# Patient Record
Sex: FEMALE | Race: WHITE | NOT HISPANIC OR LATINO | Employment: FULL TIME | RURAL
[De-identification: names, ages, dates, MRNs, and addresses within clinical notes are randomized per-mention and may not be internally consistent; named-entity substitution may affect disease eponyms.]

---

## 2020-07-29 ENCOUNTER — HISTORICAL (OUTPATIENT)
Dept: ADMINISTRATIVE | Facility: HOSPITAL | Age: 35
End: 2020-07-29

## 2020-07-30 LAB — SARS-COV-2 RNA AMPLIFICATION, QUAL: NEGATIVE

## 2020-08-07 ENCOUNTER — HISTORICAL (OUTPATIENT)
Dept: ADMINISTRATIVE | Facility: HOSPITAL | Age: 35
End: 2020-08-07

## 2020-08-08 LAB — SARS-COV+SARS-COV-2 AG RESP QL IA.RAPID: POSITIVE

## 2021-05-04 RX ORDER — FLUOXETINE HYDROCHLORIDE 20 MG/1
20 CAPSULE ORAL DAILY
COMMUNITY

## 2021-05-04 RX ORDER — HYDROCODONE BITARTRATE AND ACETAMINOPHEN 7.5; 325 MG/1; MG/1
1 TABLET ORAL EVERY 8 HOURS PRN
COMMUNITY
End: 2021-05-05 | Stop reason: SDUPTHER

## 2021-05-04 RX ORDER — AMITRIPTYLINE HYDROCHLORIDE 50 MG/1
50 TABLET, FILM COATED ORAL NIGHTLY
COMMUNITY
End: 2021-05-05 | Stop reason: SDUPTHER

## 2021-05-04 RX ORDER — CETIRIZINE HYDROCHLORIDE 10 MG/1
10 TABLET ORAL DAILY
COMMUNITY

## 2021-05-04 RX ORDER — PREGABALIN 150 MG/1
150 CAPSULE ORAL 2 TIMES DAILY
COMMUNITY
End: 2021-05-05 | Stop reason: SDUPTHER

## 2021-05-05 ENCOUNTER — OFFICE VISIT (OUTPATIENT)
Dept: PAIN MEDICINE | Facility: CLINIC | Age: 36
End: 2021-05-05
Payer: COMMERCIAL

## 2021-05-05 VITALS
WEIGHT: 221 LBS | SYSTOLIC BLOOD PRESSURE: 127 MMHG | BODY MASS INDEX: 33.49 KG/M2 | HEART RATE: 84 BPM | DIASTOLIC BLOOD PRESSURE: 83 MMHG | HEIGHT: 68 IN

## 2021-05-05 DIAGNOSIS — F11.90 CHRONIC, CONTINUOUS USE OF OPIOIDS: ICD-10-CM

## 2021-05-05 DIAGNOSIS — M25.50 ARTHRALGIA, UNSPECIFIED JOINT: Primary | Chronic | ICD-10-CM

## 2021-05-05 DIAGNOSIS — M54.17 LUMBOSACRAL RADICULOPATHY: Chronic | ICD-10-CM

## 2021-05-05 DIAGNOSIS — G89.29 CHRONIC BILATERAL LOW BACK PAIN WITH SCIATICA, SCIATICA LATERALITY UNSPECIFIED: Chronic | ICD-10-CM

## 2021-05-05 DIAGNOSIS — M54.40 CHRONIC BILATERAL LOW BACK PAIN WITH SCIATICA, SCIATICA LATERALITY UNSPECIFIED: Chronic | ICD-10-CM

## 2021-05-05 PROCEDURE — 99214 OFFICE O/P EST MOD 30 MIN: CPT | Mod: PBBFAC | Performed by: PAIN MEDICINE

## 2021-05-05 PROCEDURE — 99214 OFFICE O/P EST MOD 30 MIN: CPT | Mod: PBBFAC,,, | Performed by: PAIN MEDICINE

## 2021-05-05 PROCEDURE — 99214 HC OFFICE/OUTPT VISIT, EST, LEVL IV, 30-39 MIN: ICD-10-PCS | Mod: PBBFAC,,, | Performed by: PAIN MEDICINE

## 2021-05-05 PROCEDURE — 99214 OFFICE O/P EST MOD 30 MIN: CPT | Mod: S$PBB,,, | Performed by: PAIN MEDICINE

## 2021-05-05 RX ORDER — HYDROCODONE BITARTRATE AND ACETAMINOPHEN 7.5; 325 MG/1; MG/1
1 TABLET ORAL EVERY 8 HOURS PRN
Qty: 90 TABLET | Refills: 0 | Status: SHIPPED | OUTPATIENT
Start: 2021-05-05 | End: 2021-05-05

## 2021-05-05 RX ORDER — PREGABALIN 150 MG/1
150 CAPSULE ORAL 3 TIMES DAILY
Qty: 90 CAPSULE | Refills: 3 | Status: SHIPPED | OUTPATIENT
Start: 2021-05-11 | End: 2021-05-05

## 2021-05-05 RX ORDER — PREGABALIN 150 MG/1
150 CAPSULE ORAL 3 TIMES DAILY
Qty: 90 CAPSULE | Refills: 6 | Status: SHIPPED | OUTPATIENT
Start: 2021-05-05 | End: 2021-06-02 | Stop reason: SDUPTHER

## 2021-05-05 RX ORDER — HYDROCODONE BITARTRATE AND ACETAMINOPHEN 7.5; 325 MG/1; MG/1
1 TABLET ORAL EVERY 8 HOURS PRN
Qty: 90 TABLET | Refills: 0 | Status: SHIPPED | OUTPATIENT
Start: 2021-05-05 | End: 2021-06-04

## 2021-05-05 RX ORDER — AMITRIPTYLINE HYDROCHLORIDE 50 MG/1
50 TABLET, FILM COATED ORAL NIGHTLY
Qty: 30 TABLET | Refills: 3 | Status: SHIPPED | OUTPATIENT
Start: 2021-05-05 | End: 2021-06-02 | Stop reason: SDUPTHER

## 2021-05-05 RX ORDER — TIZANIDINE 4 MG/1
4 TABLET ORAL 3 TIMES DAILY
Qty: 90 TABLET | Refills: 3 | Status: SHIPPED | OUTPATIENT
Start: 2021-05-05 | End: 2021-06-02 | Stop reason: SDUPTHER

## 2021-06-02 ENCOUNTER — OFFICE VISIT (OUTPATIENT)
Dept: PAIN MEDICINE | Facility: CLINIC | Age: 36
End: 2021-06-02
Payer: COMMERCIAL

## 2021-06-02 VITALS
RESPIRATION RATE: 20 BRPM | HEART RATE: 101 BPM | DIASTOLIC BLOOD PRESSURE: 83 MMHG | SYSTOLIC BLOOD PRESSURE: 125 MMHG | WEIGHT: 214 LBS | HEIGHT: 68 IN | BODY MASS INDEX: 32.43 KG/M2

## 2021-06-02 DIAGNOSIS — M25.50 POLYARTHRALGIA: Primary | Chronic | ICD-10-CM

## 2021-06-02 DIAGNOSIS — M47.812 CERVICAL SPONDYLOSIS WITHOUT MYELOPATHY: Chronic | ICD-10-CM

## 2021-06-02 DIAGNOSIS — M47.817 LUMBOSACRAL SPONDYLOSIS WITHOUT MYELOPATHY: Chronic | ICD-10-CM

## 2021-06-02 DIAGNOSIS — Z79.899 ENCOUNTER FOR LONG-TERM (CURRENT) USE OF OTHER MEDICATIONS: ICD-10-CM

## 2021-06-02 LAB
CTP QC/QA: YES
POC (AMP) AMPHETAMINE: NEGATIVE
POC (BAR) BARBITURATES: NEGATIVE
POC (BUP) BUPRENORPHINE: NEGATIVE
POC (BZO) BENZODIAZEPINES: NEGATIVE
POC (COC) COCAINE: NEGATIVE
POC (MDMA) METHYLENEDIOXYMETHAMPHETAMINE 3,4: NEGATIVE
POC (MET) METHAMPHETAMINE: NEGATIVE
POC (MOP) OPIATES: ABNORMAL
POC (MTD) METHADONE: NEGATIVE
POC (OXY) OXYCODONE: NEGATIVE
POC (PCP) PHENCYCLIDINE: NEGATIVE
POC (TCA) TRICYCLIC ANTIDEPRESSANTS: NEGATIVE
POC TEMPERATURE (URINE): 94
POC THC: NEGATIVE

## 2021-06-02 PROCEDURE — 99214 OFFICE O/P EST MOD 30 MIN: CPT | Mod: S$PBB,,, | Performed by: PHYSICIAN ASSISTANT

## 2021-06-02 PROCEDURE — 80305 DRUG TEST PRSMV DIR OPT OBS: CPT | Mod: PBBFAC | Performed by: PHYSICIAN ASSISTANT

## 2021-06-02 PROCEDURE — 99214 OFFICE O/P EST MOD 30 MIN: CPT | Mod: PBBFAC | Performed by: PHYSICIAN ASSISTANT

## 2021-06-02 PROCEDURE — 99214 PR OFFICE/OUTPT VISIT, EST, LEVL IV, 30-39 MIN: ICD-10-PCS | Mod: S$PBB,,, | Performed by: PHYSICIAN ASSISTANT

## 2021-06-02 RX ORDER — PREGABALIN 150 MG/1
150 CAPSULE ORAL EVERY 8 HOURS
Qty: 90 CAPSULE | Refills: 2 | Status: SHIPPED | OUTPATIENT
Start: 2021-06-02 | End: 2021-07-22 | Stop reason: SDUPTHER

## 2021-06-02 RX ORDER — AMITRIPTYLINE HYDROCHLORIDE 50 MG/1
50 TABLET, FILM COATED ORAL NIGHTLY
Qty: 30 TABLET | Refills: 2 | Status: SHIPPED | OUTPATIENT
Start: 2021-06-02 | End: 2021-11-01 | Stop reason: SDUPTHER

## 2021-06-02 RX ORDER — TIZANIDINE 4 MG/1
4 TABLET ORAL EVERY 8 HOURS
Qty: 90 TABLET | Refills: 2 | Status: SHIPPED | OUTPATIENT
Start: 2021-06-02 | End: 2021-07-22 | Stop reason: SDUPTHER

## 2021-06-02 RX ORDER — HYDROCODONE BITARTRATE AND ACETAMINOPHEN 7.5; 325 MG/1; MG/1
1 TABLET ORAL EVERY 8 HOURS
Qty: 90 TABLET | Refills: 0 | Status: SHIPPED | OUTPATIENT
Start: 2021-07-03 | End: 2021-08-02

## 2021-06-02 RX ORDER — HYDROCODONE BITARTRATE AND ACETAMINOPHEN 7.5; 325 MG/1; MG/1
1 TABLET ORAL EVERY 8 HOURS
Qty: 90 TABLET | Refills: 0 | Status: SHIPPED | OUTPATIENT
Start: 2021-08-03 | End: 2021-09-01 | Stop reason: SDUPTHER

## 2021-06-02 RX ORDER — HYDROCODONE BITARTRATE AND ACETAMINOPHEN 7.5; 325 MG/1; MG/1
1 TABLET ORAL EVERY 8 HOURS
Qty: 90 TABLET | Refills: 0 | Status: SHIPPED | OUTPATIENT
Start: 2021-06-04 | End: 2021-07-04

## 2021-07-22 DIAGNOSIS — M47.817 LUMBOSACRAL SPONDYLOSIS WITHOUT MYELOPATHY: Chronic | ICD-10-CM

## 2021-07-22 DIAGNOSIS — M25.50 POLYARTHRALGIA: Chronic | ICD-10-CM

## 2021-07-22 DIAGNOSIS — M47.812 CERVICAL SPONDYLOSIS WITHOUT MYELOPATHY: Chronic | ICD-10-CM

## 2021-07-22 RX ORDER — TIZANIDINE 4 MG/1
4 TABLET ORAL EVERY 8 HOURS
Qty: 90 TABLET | Refills: 2 | Status: SHIPPED | OUTPATIENT
Start: 2021-07-22 | End: 2021-09-01 | Stop reason: SDUPTHER

## 2021-07-22 RX ORDER — PREGABALIN 150 MG/1
150 CAPSULE ORAL EVERY 8 HOURS
Qty: 90 CAPSULE | Refills: 2 | Status: SHIPPED | OUTPATIENT
Start: 2021-07-22 | End: 2021-09-01 | Stop reason: SDUPTHER

## 2021-09-02 ENCOUNTER — OFFICE VISIT (OUTPATIENT)
Dept: PAIN MEDICINE | Facility: CLINIC | Age: 36
End: 2021-09-02
Payer: COMMERCIAL

## 2021-09-02 VITALS
WEIGHT: 212 LBS | HEIGHT: 68 IN | BODY MASS INDEX: 32.13 KG/M2 | HEART RATE: 101 BPM | DIASTOLIC BLOOD PRESSURE: 86 MMHG | SYSTOLIC BLOOD PRESSURE: 158 MMHG

## 2021-09-02 DIAGNOSIS — M47.817 LUMBOSACRAL SPONDYLOSIS WITHOUT MYELOPATHY: Primary | Chronic | ICD-10-CM

## 2021-09-02 DIAGNOSIS — M25.50 POLYARTHRALGIA: Chronic | ICD-10-CM

## 2021-09-02 DIAGNOSIS — Z79.899 ENCOUNTER FOR LONG-TERM (CURRENT) USE OF OTHER MEDICATIONS: ICD-10-CM

## 2021-09-02 DIAGNOSIS — M47.812 CERVICAL SPONDYLOSIS WITHOUT MYELOPATHY: Chronic | ICD-10-CM

## 2021-09-02 PROCEDURE — 99213 OFFICE O/P EST LOW 20 MIN: CPT | Mod: PBBFAC | Performed by: PHYSICIAN ASSISTANT

## 2021-09-02 PROCEDURE — 99214 OFFICE O/P EST MOD 30 MIN: CPT | Mod: S$PBB,,, | Performed by: PHYSICIAN ASSISTANT

## 2021-09-02 PROCEDURE — 80305 DRUG TEST PRSMV DIR OPT OBS: CPT | Mod: PBBFAC | Performed by: PHYSICIAN ASSISTANT

## 2021-09-02 PROCEDURE — 99214 PR OFFICE/OUTPT VISIT, EST, LEVL IV, 30-39 MIN: ICD-10-PCS | Mod: S$PBB,,, | Performed by: PHYSICIAN ASSISTANT

## 2021-09-02 RX ORDER — TIZANIDINE 4 MG/1
4 TABLET ORAL EVERY 8 HOURS
Qty: 90 TABLET | Refills: 1 | Status: SHIPPED | OUTPATIENT
Start: 2021-09-02 | End: 2021-11-01 | Stop reason: SDUPTHER

## 2021-09-02 RX ORDER — HYDROCODONE BITARTRATE AND ACETAMINOPHEN 7.5; 325 MG/1; MG/1
1 TABLET ORAL EVERY 8 HOURS
Qty: 90 TABLET | Refills: 0 | Status: SHIPPED | OUTPATIENT
Start: 2021-10-05 | End: 2021-11-01 | Stop reason: SDUPTHER

## 2021-09-02 RX ORDER — HYDROCODONE BITARTRATE AND ACETAMINOPHEN 7.5; 325 MG/1; MG/1
1 TABLET ORAL EVERY 8 HOURS
Qty: 90 TABLET | Refills: 0 | Status: SHIPPED | OUTPATIENT
Start: 2021-09-05 | End: 2021-10-05

## 2021-09-02 RX ORDER — PREGABALIN 150 MG/1
150 CAPSULE ORAL EVERY 8 HOURS
Qty: 90 CAPSULE | Refills: 1 | Status: SHIPPED | OUTPATIENT
Start: 2021-09-02 | End: 2021-11-01 | Stop reason: SDUPTHER

## 2021-09-02 RX ORDER — AMITRIPTYLINE HYDROCHLORIDE 50 MG/1
50 TABLET, FILM COATED ORAL NIGHTLY
Qty: 30 TABLET | Refills: 2 | Status: CANCELLED | OUTPATIENT
Start: 2021-09-02 | End: 2021-12-01

## 2021-11-02 ENCOUNTER — OFFICE VISIT (OUTPATIENT)
Dept: PAIN MEDICINE | Facility: CLINIC | Age: 36
End: 2021-11-02
Payer: COMMERCIAL

## 2021-11-02 VITALS
WEIGHT: 217 LBS | HEART RATE: 113 BPM | DIASTOLIC BLOOD PRESSURE: 76 MMHG | RESPIRATION RATE: 18 BRPM | BODY MASS INDEX: 32.89 KG/M2 | SYSTOLIC BLOOD PRESSURE: 116 MMHG | HEIGHT: 68 IN

## 2021-11-02 DIAGNOSIS — M25.50 POLYARTHRALGIA: Chronic | ICD-10-CM

## 2021-11-02 DIAGNOSIS — M47.812 CERVICAL SPONDYLOSIS WITHOUT MYELOPATHY: Chronic | ICD-10-CM

## 2021-11-02 DIAGNOSIS — M47.817 LUMBOSACRAL SPONDYLOSIS WITHOUT MYELOPATHY: Primary | Chronic | ICD-10-CM

## 2021-11-02 PROCEDURE — 99214 OFFICE O/P EST MOD 30 MIN: CPT | Mod: S$PBB,,, | Performed by: PHYSICIAN ASSISTANT

## 2021-11-02 PROCEDURE — 99214 OFFICE O/P EST MOD 30 MIN: CPT | Mod: PBBFAC | Performed by: PHYSICIAN ASSISTANT

## 2021-11-02 PROCEDURE — 99214 PR OFFICE/OUTPT VISIT, EST, LEVL IV, 30-39 MIN: ICD-10-PCS | Mod: S$PBB,,, | Performed by: PHYSICIAN ASSISTANT

## 2021-11-02 RX ORDER — HYDROCODONE BITARTRATE AND ACETAMINOPHEN 7.5; 325 MG/1; MG/1
1 TABLET ORAL EVERY 8 HOURS
Qty: 90 TABLET | Refills: 0 | Status: SHIPPED | OUTPATIENT
Start: 2021-11-04 | End: 2021-12-02 | Stop reason: SDUPTHER

## 2021-11-02 RX ORDER — PREGABALIN 150 MG/1
150 CAPSULE ORAL EVERY 8 HOURS
Qty: 90 CAPSULE | Refills: 1 | Status: SHIPPED | OUTPATIENT
Start: 2021-11-02 | End: 2021-12-02 | Stop reason: SDUPTHER

## 2021-11-02 RX ORDER — PREGABALIN 150 MG/1
150 CAPSULE ORAL EVERY 8 HOURS
Qty: 90 CAPSULE | Refills: 1 | Status: SHIPPED | OUTPATIENT
Start: 2021-11-02 | End: 2021-11-02

## 2021-11-02 RX ORDER — AMITRIPTYLINE HYDROCHLORIDE 50 MG/1
50 TABLET, FILM COATED ORAL NIGHTLY
Qty: 30 TABLET | Refills: 1 | Status: SHIPPED | OUTPATIENT
Start: 2021-11-02 | End: 2021-12-02 | Stop reason: SDUPTHER

## 2021-11-02 RX ORDER — HYDROCODONE BITARTRATE AND ACETAMINOPHEN 7.5; 325 MG/1; MG/1
1 TABLET ORAL EVERY 8 HOURS
Qty: 90 TABLET | Refills: 0 | Status: SHIPPED | OUTPATIENT
Start: 2021-11-04 | End: 2021-11-02

## 2021-11-02 RX ORDER — TIZANIDINE 4 MG/1
4 TABLET ORAL EVERY 8 HOURS
Qty: 90 TABLET | Refills: 1 | Status: SHIPPED | OUTPATIENT
Start: 2021-11-02 | End: 2021-12-02 | Stop reason: SDUPTHER

## 2021-11-02 RX ORDER — TIZANIDINE 4 MG/1
4 TABLET ORAL EVERY 8 HOURS
Qty: 90 TABLET | Refills: 1 | Status: SHIPPED | OUTPATIENT
Start: 2021-11-02 | End: 2021-11-02

## 2021-11-02 RX ORDER — AMITRIPTYLINE HYDROCHLORIDE 50 MG/1
50 TABLET, FILM COATED ORAL NIGHTLY
Qty: 30 TABLET | Refills: 1 | Status: SHIPPED | OUTPATIENT
Start: 2021-11-02 | End: 2021-11-02

## 2021-12-03 ENCOUNTER — OFFICE VISIT (OUTPATIENT)
Dept: PAIN MEDICINE | Facility: CLINIC | Age: 36
End: 2021-12-03
Payer: COMMERCIAL

## 2021-12-03 VITALS
WEIGHT: 220 LBS | HEIGHT: 68 IN | HEART RATE: 110 BPM | SYSTOLIC BLOOD PRESSURE: 138 MMHG | DIASTOLIC BLOOD PRESSURE: 94 MMHG | BODY MASS INDEX: 33.34 KG/M2

## 2021-12-03 DIAGNOSIS — M47.817 LUMBOSACRAL SPONDYLOSIS WITHOUT MYELOPATHY: Chronic | ICD-10-CM

## 2021-12-03 DIAGNOSIS — M47.812 CERVICAL SPONDYLOSIS WITHOUT MYELOPATHY: Chronic | ICD-10-CM

## 2021-12-03 DIAGNOSIS — Z79.899 ENCOUNTER FOR LONG-TERM (CURRENT) USE OF OTHER MEDICATIONS: Primary | ICD-10-CM

## 2021-12-03 DIAGNOSIS — M25.50 POLYARTHRALGIA: Chronic | ICD-10-CM

## 2021-12-03 PROCEDURE — 99214 OFFICE O/P EST MOD 30 MIN: CPT | Mod: S$PBB,25,, | Performed by: PHYSICIAN ASSISTANT

## 2021-12-03 PROCEDURE — 96372 THER/PROPH/DIAG INJ SC/IM: CPT | Mod: PBBFAC | Performed by: PHYSICIAN ASSISTANT

## 2021-12-03 PROCEDURE — 99214 PR OFFICE/OUTPT VISIT, EST, LEVL IV, 30-39 MIN: ICD-10-PCS | Mod: S$PBB,25,, | Performed by: PHYSICIAN ASSISTANT

## 2021-12-03 PROCEDURE — 80305 DRUG TEST PRSMV DIR OPT OBS: CPT | Mod: PBBFAC | Performed by: PHYSICIAN ASSISTANT

## 2021-12-03 PROCEDURE — 99213 OFFICE O/P EST LOW 20 MIN: CPT | Mod: PBBFAC | Performed by: PHYSICIAN ASSISTANT

## 2021-12-03 RX ORDER — PREGABALIN 150 MG/1
150 CAPSULE ORAL EVERY 8 HOURS
Qty: 90 CAPSULE | Refills: 1 | Status: SHIPPED | OUTPATIENT
Start: 2021-12-03 | End: 2022-03-02 | Stop reason: SDUPTHER

## 2021-12-03 RX ORDER — KETOROLAC TROMETHAMINE 30 MG/ML
60 INJECTION, SOLUTION INTRAMUSCULAR; INTRAVENOUS
Status: COMPLETED | OUTPATIENT
Start: 2021-12-03 | End: 2021-12-03

## 2021-12-03 RX ORDER — AMITRIPTYLINE HYDROCHLORIDE 50 MG/1
50 TABLET, FILM COATED ORAL NIGHTLY
Qty: 30 TABLET | Refills: 1 | Status: SHIPPED | OUTPATIENT
Start: 2021-12-03 | End: 2022-06-30 | Stop reason: SDUPTHER

## 2021-12-03 RX ORDER — DICLOFENAC SODIUM 75 MG/1
75 TABLET, DELAYED RELEASE ORAL DAILY
COMMUNITY
End: 2022-01-26 | Stop reason: SDUPTHER

## 2021-12-03 RX ORDER — TIZANIDINE 4 MG/1
4 TABLET ORAL EVERY 8 HOURS
Qty: 90 TABLET | Refills: 1 | Status: SHIPPED | OUTPATIENT
Start: 2021-12-03 | End: 2022-03-02 | Stop reason: SDUPTHER

## 2021-12-03 RX ORDER — HYDROCODONE BITARTRATE AND ACETAMINOPHEN 7.5; 325 MG/1; MG/1
1 TABLET ORAL EVERY 8 HOURS
Qty: 90 TABLET | Refills: 0 | Status: SHIPPED | OUTPATIENT
Start: 2021-12-04 | End: 2021-12-03

## 2021-12-03 RX ORDER — TIZANIDINE 4 MG/1
4 TABLET ORAL EVERY 8 HOURS
Qty: 90 TABLET | Refills: 1 | Status: SHIPPED | OUTPATIENT
Start: 2021-12-03 | End: 2021-12-03

## 2021-12-03 RX ORDER — HYDROCODONE BITARTRATE AND ACETAMINOPHEN 7.5; 325 MG/1; MG/1
1 TABLET ORAL EVERY 8 HOURS
Qty: 90 TABLET | Refills: 0 | Status: SHIPPED | OUTPATIENT
Start: 2021-12-04 | End: 2022-01-03

## 2021-12-03 RX ORDER — PREGABALIN 150 MG/1
150 CAPSULE ORAL EVERY 8 HOURS
Qty: 90 CAPSULE | Refills: 1 | Status: SHIPPED | OUTPATIENT
Start: 2021-12-03 | End: 2021-12-03

## 2021-12-03 RX ORDER — AMITRIPTYLINE HYDROCHLORIDE 50 MG/1
50 TABLET, FILM COATED ORAL NIGHTLY
Qty: 30 TABLET | Refills: 1 | Status: SHIPPED | OUTPATIENT
Start: 2021-12-03 | End: 2021-12-03

## 2021-12-03 RX ORDER — HYDROCODONE BITARTRATE AND ACETAMINOPHEN 7.5; 325 MG/1; MG/1
1 TABLET ORAL EVERY 8 HOURS
Qty: 90 TABLET | Refills: 0 | Status: SHIPPED | OUTPATIENT
Start: 2022-01-03 | End: 2021-12-03

## 2021-12-03 RX ORDER — HYDROCODONE BITARTRATE AND ACETAMINOPHEN 7.5; 325 MG/1; MG/1
1 TABLET ORAL EVERY 8 HOURS
Qty: 90 TABLET | Refills: 0 | Status: SHIPPED | OUTPATIENT
Start: 2022-01-03 | End: 2022-02-04 | Stop reason: SDUPTHER

## 2021-12-03 RX ADMIN — KETOROLAC TROMETHAMINE 60 MG: 30 INJECTION, SOLUTION INTRAMUSCULAR; INTRAVENOUS at 10:12

## 2022-01-26 RX ORDER — DICLOFENAC SODIUM 75 MG/1
75 TABLET, DELAYED RELEASE ORAL DAILY
Qty: 30 TABLET | Refills: 0 | Status: SHIPPED | OUTPATIENT
Start: 2022-01-26 | End: 2022-02-17

## 2022-01-26 NOTE — TELEPHONE ENCOUNTER
----- Message from Laura Panchal sent at 1/25/2022  3:03 PM CST -----  Regarding: Medication  Patient would like a Rx for diclofenac to [facility] [place].  Please call [phone].

## 2022-02-04 RX ORDER — HYDROCODONE BITARTRATE AND ACETAMINOPHEN 7.5; 325 MG/1; MG/1
1 TABLET ORAL EVERY 8 HOURS
Qty: 90 TABLET | Refills: 0 | Status: SHIPPED | OUTPATIENT
Start: 2022-02-04 | End: 2022-03-02 | Stop reason: SDUPTHER

## 2022-03-02 NOTE — PROGRESS NOTES
Disclaimer:  This note has been generated using voice recognition software.  There may be type of graft focal areas that have been missed during a proof reading      Subjective:      Patient ID: Monserrat Lee is a 36 y.o. female.    Chief Complaint: Low-back Pain and Leg Pain      Pain  This is a chronic problem. The current episode started more than 1 year ago. The problem occurs daily. The problem has been waxing and waning. Associated symptoms include arthralgias and neck pain. Pertinent negatives include no change in bowel habit, chest pain, coughing, diaphoresis, fever, rash, sore throat, vertigo or vomiting.     Review of Systems   Constitutional: Negative for appetite change, diaphoresis, fever and unexpected weight change.   HENT: Negative for drooling, ear discharge, ear pain, facial swelling, nosebleeds, sore throat, trouble swallowing, voice change and goiter.    Eyes: Negative for photophobia, pain, discharge, redness and visual disturbance.   Respiratory: Negative for apnea, cough, choking, chest tightness, shortness of breath, wheezing and stridor.    Cardiovascular: Negative for chest pain, palpitations and leg swelling.   Gastrointestinal: Negative for abdominal distention, change in bowel habit, diarrhea, rectal pain, vomiting, fecal incontinence and change in bowel habit.   Endocrine: Negative for cold intolerance, heat intolerance, polydipsia, polyphagia and polyuria.   Genitourinary: Negative for bladder incontinence, dysuria, flank pain, frequency and hot flashes.   Musculoskeletal: Positive for arthralgias, back pain, leg pain and neck pain.   Integumentary:  Negative for color change, pallor and rash.   Allergic/Immunologic: Negative for immunocompromised state.   Neurological: Negative for dizziness, vertigo, seizures, syncope, facial asymmetry, speech difficulty, light-headedness, disturbances in coordination, memory loss and coordination difficulties.   Hematological: Negative for  "adenopathy. Does not bruise/bleed easily.   Psychiatric/Behavioral: Negative for agitation, behavioral problems, confusion, decreased concentration, dysphoric mood, hallucinations, self-injury and suicidal ideas. The patient is not nervous/anxious and is not hyperactive.             Objective:  Vitals:    03/04/22 0806 03/04/22 0809   BP: (!) 141/86    Pulse: 95    Resp: 20    Weight: 100.2 kg (221 lb)    Height: 5' 9" (1.753 m)    PainSc:   6   6         Physical Exam  Vitals and nursing note reviewed. Exam conducted with a chaperone present.   Constitutional:       General: She is awake. She is not in acute distress.     Appearance: Normal appearance. She is not ill-appearing.   HENT:      Head: Normocephalic and atraumatic.      Nose: Nose normal.      Mouth/Throat:      Mouth: Mucous membranes are moist.      Pharynx: Oropharynx is clear.   Eyes:      Conjunctiva/sclera: Conjunctivae normal.      Pupils: Pupils are equal, round, and reactive to light.   Cardiovascular:      Rate and Rhythm: Normal rate.   Pulmonary:      Effort: Pulmonary effort is normal. No respiratory distress.   Abdominal:      Palpations: Abdomen is soft.   Musculoskeletal:         General: Normal range of motion.      Right shoulder: Tenderness present.      Left shoulder: Tenderness present.      Right wrist: Tenderness present.      Left wrist: Tenderness present.      Cervical back: Normal range of motion and neck supple. Tenderness present.      Thoracic back: Tenderness present.      Lumbar back: Tenderness present.   Skin:     General: Skin is warm and dry.   Neurological:      General: No focal deficit present.      Mental Status: She is alert and oriented to person, place, and time. Mental status is at baseline.      Cranial Nerves: Cranial nerves are intact. No cranial nerve deficit (II-XII).   Psychiatric:         Mood and Affect: Mood normal.         Behavior: Behavior normal. Behavior is cooperative.         Thought Content: " Thought content normal.           Orders Placed This Encounter   Procedures    POCT Urine Drug Screen Presump     Interpretive Information:     Negative:  No drug detected at the cut off level.   Positive:  This result represents presumptive positive for the   tested drug, other substances may yield a positive response other   than the analyte of interest. This result should be utilized for   diagnostic purpose only. Confirmation testing will be performed upon physician request only.           No image results found.       Office Visit on 12/03/2021   Component Date Value Ref Range Status    POC Amphetamines 12/03/2021 Negative  Negative, Inconclusive Final    POC Barbiturates 12/03/2021 Negative  Negative, Inconclusive Final    POC Benzodiazepines 12/03/2021 Negative  Negative, Inconclusive Final    POC Cocaine 12/03/2021 Negative  Negative, Inconclusive Final    POC THC 12/03/2021 Negative  Negative, Inconclusive Final    POC Methadone 12/03/2021 Negative  Negative, Inconclusive Final    POC Methamphetamine 12/03/2021 Negative  Negative, Inconclusive Final    POC Opiates 12/03/2021 Presumptive Positive (A) Negative, Inconclusive Final    POC Oxycodone 12/03/2021 Negative  Negative, Inconclusive Final    POC Phencyclidine 12/03/2021 Negative  Negative, Inconclusive Final    POC Methylenedioxymethamphetamine * 12/03/2021 Negative  Negative, Inconclusive Final    POC Tricyclic Antidepressants 12/03/2021 Negative  Negative, Inconclusive Final    POC Buprenorphine 12/03/2021 Negative   Final     Acceptable 12/03/2021 Yes   Final    POC Temperature (Urine) 12/03/2021 94   Final         Assessment:      1. Polyarthralgia    2. Cervical spondylosis without myelopathy    3. Lumbosacral spondylosis without myelopathy    4. Encounter for long-term (current) use of other medications            A's of Opioid Risk Assessment  Activity:Patient can perform ADL.   Analgesia:Patients pain is partially  controlled by current medication. Patient has tried OTC medications such as Tylenol and Ibuprofen with out relief.   Adverse Effects: Patient denies constipation or sedation.  Aberrant Behavior:  reviewed with no aberrant drug seeking/taking behavior.  Overdose reversal drug naloxone discussed    Drug screen reviewed      Requested Prescriptions     Signed Prescriptions Disp Refills    pregabalin (LYRICA) 150 MG capsule 90 capsule 1     Sig: Take 1 capsule (150 mg total) by mouth every 8 (eight) hours.    tiZANidine (ZANAFLEX) 4 MG tablet 90 tablet 1     Sig: Take 1 tablet (4 mg total) by mouth every 8 (eight) hours.    HYDROcodone-acetaminophen (NORCO) 7.5-325 mg per tablet 90 tablet 0     Sig: Take 1 tablet by mouth every 8 (eight) hours.    HYDROcodone-acetaminophen (NORCO) 7.5-325 mg per tablet 90 tablet 0     Sig: Take 1 tablet by mouth every 8 (eight) hours.    diclofenac (VOLTAREN) 75 MG EC tablet 30 tablet 1     Sig: Take 1 tablet (75 mg total) by mouth once daily.         Plan:    No new complaints    She states current medications helping control her chronic joint back pain    She would like to continue with conservative management    Continue home exercise program as directed    Continue current medication    Follow-up 2 months    Dr. Velarde, May 2022    Bring original prescription medication bottles/container/box with labels to each visit

## 2022-03-04 ENCOUNTER — OFFICE VISIT (OUTPATIENT)
Dept: PAIN MEDICINE | Facility: CLINIC | Age: 37
End: 2022-03-04

## 2022-03-04 VITALS
RESPIRATION RATE: 20 BRPM | HEIGHT: 69 IN | BODY MASS INDEX: 32.73 KG/M2 | HEART RATE: 95 BPM | SYSTOLIC BLOOD PRESSURE: 141 MMHG | WEIGHT: 221 LBS | DIASTOLIC BLOOD PRESSURE: 86 MMHG

## 2022-03-04 DIAGNOSIS — M25.50 POLYARTHRALGIA: Primary | Chronic | ICD-10-CM

## 2022-03-04 DIAGNOSIS — Z79.899 ENCOUNTER FOR LONG-TERM (CURRENT) USE OF OTHER MEDICATIONS: ICD-10-CM

## 2022-03-04 DIAGNOSIS — M47.812 CERVICAL SPONDYLOSIS WITHOUT MYELOPATHY: Chronic | ICD-10-CM

## 2022-03-04 DIAGNOSIS — M47.817 LUMBOSACRAL SPONDYLOSIS WITHOUT MYELOPATHY: Chronic | ICD-10-CM

## 2022-03-04 LAB
CTP QC/QA: YES
POC (AMP) AMPHETAMINE: NEGATIVE
POC (BAR) BARBITURATES: NEGATIVE
POC (BUP) BUPRENORPHINE: NEGATIVE
POC (BZO) BENZODIAZEPINES: NEGATIVE
POC (COC) COCAINE: NEGATIVE
POC (MDMA) METHYLENEDIOXYMETHAMPHETAMINE 3,4: NEGATIVE
POC (MET) METHAMPHETAMINE: NEGATIVE
POC (MOP) OPIATES: ABNORMAL
POC (MTD) METHADONE: NEGATIVE
POC (OXY) OXYCODONE: NEGATIVE
POC (PCP) PHENCYCLIDINE: NEGATIVE
POC (TCA) TRICYCLIC ANTIDEPRESSANTS: NEGATIVE
POC TEMPERATURE (URINE): 92
POC THC: NEGATIVE

## 2022-03-04 PROCEDURE — 99213 OFFICE O/P EST LOW 20 MIN: CPT | Mod: PBBFAC | Performed by: PHYSICIAN ASSISTANT

## 2022-03-04 PROCEDURE — 3079F PR MOST RECENT DIASTOLIC BLOOD PRESSURE 80-89 MM HG: ICD-10-PCS | Mod: CPTII,,, | Performed by: PHYSICIAN ASSISTANT

## 2022-03-04 PROCEDURE — 3079F DIAST BP 80-89 MM HG: CPT | Mod: CPTII,,, | Performed by: PHYSICIAN ASSISTANT

## 2022-03-04 PROCEDURE — 1159F PR MEDICATION LIST DOCUMENTED IN MEDICAL RECORD: ICD-10-PCS | Mod: CPTII,,, | Performed by: PHYSICIAN ASSISTANT

## 2022-03-04 PROCEDURE — 1159F MED LIST DOCD IN RCRD: CPT | Mod: CPTII,,, | Performed by: PHYSICIAN ASSISTANT

## 2022-03-04 PROCEDURE — 3077F SYST BP >= 140 MM HG: CPT | Mod: CPTII,,, | Performed by: PHYSICIAN ASSISTANT

## 2022-03-04 PROCEDURE — 80305 DRUG TEST PRSMV DIR OPT OBS: CPT | Mod: PBBFAC | Performed by: PHYSICIAN ASSISTANT

## 2022-03-04 PROCEDURE — 3008F BODY MASS INDEX DOCD: CPT | Mod: CPTII,,, | Performed by: PHYSICIAN ASSISTANT

## 2022-03-04 PROCEDURE — 99214 PR OFFICE/OUTPT VISIT, EST, LEVL IV, 30-39 MIN: ICD-10-PCS | Mod: S$PBB,,, | Performed by: PHYSICIAN ASSISTANT

## 2022-03-04 PROCEDURE — 3077F PR MOST RECENT SYSTOLIC BLOOD PRESSURE >= 140 MM HG: ICD-10-PCS | Mod: CPTII,,, | Performed by: PHYSICIAN ASSISTANT

## 2022-03-04 PROCEDURE — 99214 OFFICE O/P EST MOD 30 MIN: CPT | Mod: S$PBB,,, | Performed by: PHYSICIAN ASSISTANT

## 2022-03-04 PROCEDURE — 3008F PR BODY MASS INDEX (BMI) DOCUMENTED: ICD-10-PCS | Mod: CPTII,,, | Performed by: PHYSICIAN ASSISTANT

## 2022-03-04 RX ORDER — HYDROCODONE BITARTRATE AND ACETAMINOPHEN 7.5; 325 MG/1; MG/1
1 TABLET ORAL EVERY 8 HOURS
Qty: 90 TABLET | Refills: 0 | Status: SHIPPED | OUTPATIENT
Start: 2022-04-05 | End: 2022-05-05

## 2022-03-04 RX ORDER — AMITRIPTYLINE HYDROCHLORIDE 50 MG/1
50 TABLET, FILM COATED ORAL NIGHTLY
Qty: 30 TABLET | Refills: 1 | Status: CANCELLED | OUTPATIENT
Start: 2022-03-04

## 2022-03-04 RX ORDER — PREGABALIN 150 MG/1
150 CAPSULE ORAL EVERY 8 HOURS
Qty: 90 CAPSULE | Refills: 1 | Status: SHIPPED | OUTPATIENT
Start: 2022-03-04 | End: 2022-05-04 | Stop reason: SDUPTHER

## 2022-03-04 RX ORDER — TIZANIDINE 4 MG/1
4 TABLET ORAL EVERY 8 HOURS
Qty: 90 TABLET | Refills: 1 | Status: SHIPPED | OUTPATIENT
Start: 2022-03-04 | End: 2022-05-04 | Stop reason: SDUPTHER

## 2022-03-04 RX ORDER — DICLOFENAC SODIUM 75 MG/1
75 TABLET, DELAYED RELEASE ORAL DAILY
Qty: 30 TABLET | Refills: 1 | Status: SHIPPED | OUTPATIENT
Start: 2022-03-04 | End: 2022-05-04 | Stop reason: SDUPTHER

## 2022-03-04 RX ORDER — HYDROCODONE BITARTRATE AND ACETAMINOPHEN 7.5; 325 MG/1; MG/1
1 TABLET ORAL EVERY 8 HOURS
Qty: 90 TABLET | Refills: 0 | Status: SHIPPED | OUTPATIENT
Start: 2022-03-05 | End: 2022-04-04

## 2022-05-04 ENCOUNTER — OFFICE VISIT (OUTPATIENT)
Dept: PAIN MEDICINE | Facility: CLINIC | Age: 37
End: 2022-05-04
Payer: COMMERCIAL

## 2022-05-04 VITALS
HEART RATE: 106 BPM | SYSTOLIC BLOOD PRESSURE: 135 MMHG | WEIGHT: 220.63 LBS | DIASTOLIC BLOOD PRESSURE: 78 MMHG | HEIGHT: 70 IN | BODY MASS INDEX: 31.59 KG/M2

## 2022-05-04 DIAGNOSIS — M47.812 CERVICAL SPONDYLOSIS WITHOUT MYELOPATHY: Chronic | ICD-10-CM

## 2022-05-04 DIAGNOSIS — M25.50 POLYARTHRALGIA: Chronic | ICD-10-CM

## 2022-05-04 DIAGNOSIS — M47.817 LUMBOSACRAL SPONDYLOSIS WITHOUT MYELOPATHY: Chronic | ICD-10-CM

## 2022-05-04 DIAGNOSIS — M54.50 CHRONIC BILATERAL LOW BACK PAIN WITHOUT SCIATICA: Primary | ICD-10-CM

## 2022-05-04 DIAGNOSIS — G89.29 CHRONIC BILATERAL LOW BACK PAIN WITHOUT SCIATICA: Primary | ICD-10-CM

## 2022-05-04 PROCEDURE — 1159F PR MEDICATION LIST DOCUMENTED IN MEDICAL RECORD: ICD-10-PCS | Mod: CPTII,,, | Performed by: PAIN MEDICINE

## 2022-05-04 PROCEDURE — 3008F PR BODY MASS INDEX (BMI) DOCUMENTED: ICD-10-PCS | Mod: CPTII,,, | Performed by: PAIN MEDICINE

## 2022-05-04 PROCEDURE — 3075F PR MOST RECENT SYSTOLIC BLOOD PRESS GE 130-139MM HG: ICD-10-PCS | Mod: CPTII,,, | Performed by: PAIN MEDICINE

## 2022-05-04 PROCEDURE — 3075F SYST BP GE 130 - 139MM HG: CPT | Mod: CPTII,,, | Performed by: PAIN MEDICINE

## 2022-05-04 PROCEDURE — 99214 PR OFFICE/OUTPT VISIT, EST, LEVL IV, 30-39 MIN: ICD-10-PCS | Mod: S$PBB,,, | Performed by: PAIN MEDICINE

## 2022-05-04 PROCEDURE — 1159F MED LIST DOCD IN RCRD: CPT | Mod: CPTII,,, | Performed by: PAIN MEDICINE

## 2022-05-04 PROCEDURE — 3008F BODY MASS INDEX DOCD: CPT | Mod: CPTII,,, | Performed by: PAIN MEDICINE

## 2022-05-04 PROCEDURE — 3078F PR MOST RECENT DIASTOLIC BLOOD PRESSURE < 80 MM HG: ICD-10-PCS | Mod: CPTII,,, | Performed by: PAIN MEDICINE

## 2022-05-04 PROCEDURE — 3078F DIAST BP <80 MM HG: CPT | Mod: CPTII,,, | Performed by: PAIN MEDICINE

## 2022-05-04 PROCEDURE — 99214 OFFICE O/P EST MOD 30 MIN: CPT | Mod: S$PBB,,, | Performed by: PAIN MEDICINE

## 2022-05-04 PROCEDURE — 99213 OFFICE O/P EST LOW 20 MIN: CPT | Mod: PBBFAC | Performed by: PAIN MEDICINE

## 2022-05-04 RX ORDER — DICLOFENAC SODIUM 75 MG/1
75 TABLET, DELAYED RELEASE ORAL 2 TIMES DAILY
Qty: 60 TABLET | Refills: 1 | Status: SHIPPED | OUTPATIENT
Start: 2022-05-04 | End: 2022-06-30 | Stop reason: SDUPTHER

## 2022-05-04 RX ORDER — TIZANIDINE 4 MG/1
4 TABLET ORAL EVERY 8 HOURS
Qty: 90 TABLET | Refills: 1 | Status: SHIPPED | OUTPATIENT
Start: 2022-05-04 | End: 2022-07-11 | Stop reason: SDUPTHER

## 2022-05-04 RX ORDER — PREGABALIN 150 MG/1
150 CAPSULE ORAL EVERY 8 HOURS
Qty: 90 CAPSULE | Refills: 1 | Status: SHIPPED | OUTPATIENT
Start: 2022-05-04 | End: 2022-06-30 | Stop reason: SDUPTHER

## 2022-05-04 RX ORDER — HYDROCODONE BITARTRATE AND ACETAMINOPHEN 7.5; 325 MG/1; MG/1
1 TABLET ORAL EVERY 8 HOURS PRN
Qty: 90 TABLET | Refills: 0 | Status: SHIPPED | OUTPATIENT
Start: 2022-06-04 | End: 2022-06-30 | Stop reason: SDUPTHER

## 2022-05-04 RX ORDER — HYDROCODONE BITARTRATE AND ACETAMINOPHEN 7.5; 325 MG/1; MG/1
1 TABLET ORAL EVERY 8 HOURS PRN
Qty: 90 TABLET | Refills: 0 | Status: SHIPPED | OUTPATIENT
Start: 2022-05-05 | End: 2022-06-04

## 2022-05-04 NOTE — PROGRESS NOTES
She Disclaimer: This note has been generated using voice-recognition software. There may be typographical errors that have been missed during proof-reading        Patient ID: Monserrat Lee is a 36 y.o. female.      Chief Complaint: Low-back Pain      36-year-old female returns today for re-evaluation.  Patient has a diagnosis of porphyria and also suffers from chronic lower back pain and radicular symptoms.  She did not receive a rheumatology evaluation but remains under the care of Dr. Morales for porphyria. She has been on opiates for several years and notes adequate pain relief. She was seen by orthopedic and prescribed diclofenac  for left shoulder pain.  She denies any other  changes since her last office visit          Pain Assessment  Pain Score:   5  Pain Location: Other (Comment) (lower back)  Pain Orientation: Right  Pain Radiating Towards: radiates down right leg to knee  Pain Descriptors: Aching, Constant, Dull, Sharp  Pain Frequency: Continuous  Pain Onset: Awakened from sleep  Clinical Progression: Not changed  Aggravating Factors: Standing, Walking  Pain Intervention(s): Medication (See eMAR), Rest, Heat applied      A's of Opioid Risk Assessment  Activity:Patient can perform ADL.   Analgesia:Patients pain is  controlled by current medication.   Adverse Effects: Patient denies constipation or sedation.  Aberrant Behavior:  reviewed with no aberrant drug seeking/taking behavior.      Patient denies any suicidal or homicidal ideations    Physical Therapy/Home Exercise: yes      No image results found.      Review of Systems   Constitutional: Negative.    HENT: Negative.    Eyes: Negative.    Respiratory: Negative.    Cardiovascular: Negative.    Gastrointestinal: Negative.    Endocrine: Negative.    Genitourinary: Negative.    Musculoskeletal: Positive for arthralgias, back pain and myalgias.   Integumentary:  Negative.   Neurological: Negative.    Hematological: Negative.    Psychiatric/Behavioral:  Negative.              Past Medical History:   Diagnosis Date    Anxiety disorder     Carpal tunnel syndrome     Cervical radiculopathy     Cervical spondylosis without myelopathy     Chronic pain syndrome     Depressive disorder     Herpes simplex     Insomnia     Low back pain     Lumbosacral radiculopathy     Sacroiliitis, not elsewhere classified      Past Surgical History:   Procedure Laterality Date    FOOT SURGERY Left     Cyst Removal Left Foot    TUBAL LIGATION  2008     Social History     Socioeconomic History    Marital status:    Tobacco Use    Smoking status: Current Every Day Smoker     Packs/day: 1.00     Types: Cigarettes    Smokeless tobacco: Never Used   Substance and Sexual Activity    Alcohol use: Not Currently    Drug use: Yes     Types: Hydrocodone     Family History   Problem Relation Age of Onset    Hypertension Father      Review of patient's allergies indicates:  No Known Allergies  has a current medication list which includes the following prescription(s): amitriptyline, cetirizine, fluoxetine, hydrocodone-acetaminophen, diclofenac, [START ON 5/5/2022] hydrocodone-acetaminophen, [START ON 6/4/2022] hydrocodone-acetaminophen, pregabalin, and tizanidine.      Objective:  Vitals:    05/04/22 0853   BP: 135/78   Pulse: 106        Physical Exam  Vitals and nursing note reviewed.   Constitutional:       General: She is not in acute distress.     Appearance: Normal appearance. She is not ill-appearing, toxic-appearing or diaphoretic.   HENT:      Head: Normocephalic and atraumatic.      Nose: Nose normal.      Mouth/Throat:      Mouth: Mucous membranes are moist.   Eyes:      Extraocular Movements: Extraocular movements intact.      Pupils: Pupils are equal, round, and reactive to light.   Cardiovascular:      Rate and Rhythm: Normal rate and regular rhythm.      Heart sounds: Normal heart sounds.   Pulmonary:      Effort: Pulmonary effort is normal. No respiratory  distress.      Breath sounds: Normal breath sounds. No stridor. No wheezing or rhonchi.   Abdominal:      General: Bowel sounds are normal.      Palpations: Abdomen is soft.   Musculoskeletal:         General: No swelling or deformity. Normal range of motion.      Cervical back: Normal and normal range of motion. No spasms or tenderness. No pain with movement. Normal range of motion.      Thoracic back: Normal.      Lumbar back: Tenderness and bony tenderness present. No spasms. Normal range of motion. Negative right straight leg raise test and negative left straight leg raise test. No scoliosis.      Right lower leg: No edema.      Left lower leg: No edema.   Skin:     General: Skin is warm.   Neurological:      General: No focal deficit present.      Mental Status: She is alert and oriented to person, place, and time. Mental status is at baseline.      Cranial Nerves: Cranial nerves are intact. No cranial nerve deficit.      Sensory: Sensation is intact. No sensory deficit.      Motor: No weakness.      Coordination: Coordination normal.      Gait: Gait normal.      Deep Tendon Reflexes: Reflexes are normal and symmetric.   Psychiatric:         Mood and Affect: Mood normal.         Behavior: Behavior normal.           Assessment:      1. Cervical spondylosis without myelopathy    2. Lumbosacral spondylosis without myelopathy    3. Polyarthralgia          Plan:  1. reviewed  2.Addiction, Dependency, Tolerance, Opioid abuse-misuse, Death, Diversion Discussed. Overdose reversal drug Naloxone discussed.  3.Refill/Continue medications for pain control and function       Requested Prescriptions     Signed Prescriptions Disp Refills    diclofenac (VOLTAREN) 75 MG EC tablet 60 tablet 1     Sig: Take 1 tablet (75 mg total) by mouth 2 (two) times daily.    tiZANidine (ZANAFLEX) 4 MG tablet 90 tablet 1     Sig: Take 1 tablet (4 mg total) by mouth every 8 (eight) hours.    pregabalin (LYRICA) 150 MG capsule 90 capsule 1      Sig: Take 1 capsule (150 mg total) by mouth every 8 (eight) hours.    HYDROcodone-acetaminophen (NORCO) 7.5-325 mg per tablet 90 tablet 0     Sig: Take 1 tablet by mouth every 8 (eight) hours as needed for Pain.    HYDROcodone-acetaminophen (NORCO) 7.5-325 mg per tablet 90 tablet 0     Sig: Take 1 tablet by mouth every 8 (eight) hours as needed for Pain.     4.Patient defers nerve block injections, physical therapy or surgical consultation     5.Follow with BETITO Gentile in 2 months for re-evaluation and medication refill         report:  Reviewed and consistent with medication use as prescribed.      The total time spent for evaluation and management on 05/04/2022 including reviewing separately obtained history, performing a medically appropriate exam and evaluation, documenting clinical information in the health record, independently interpreting results and communicating them to the patient/family/caregiver, and ordering medications/tests/procedures was between 15-29 minutes.    The above plan and management options were discussed at length with patient. Patient is in agreement with the above and verbalized understanding. It will be communicated with the referring physician via electronic record, fax, or mail.

## 2022-06-30 NOTE — PROGRESS NOTES
Subjective:      Patient ID: Monserrat Lee is a 36 y.o. female.    Chief Complaint: Low-back Pain      Pain  This is a chronic problem. The current episode started more than 1 year ago. The problem occurs daily. The problem has been unchanged. Associated symptoms include arthralgias and neck pain. Pertinent negatives include no change in bowel habit, chest pain, coughing, diaphoresis, fever, rash, sore throat, vertigo or vomiting.     Review of Systems   Constitutional: Negative for appetite change, diaphoresis, fever and unexpected weight change.   HENT: Negative for drooling, ear discharge, ear pain, facial swelling, nosebleeds, sore throat, trouble swallowing, voice change and goiter.    Eyes: Negative for photophobia, pain, discharge, redness and visual disturbance.   Respiratory: Negative for apnea, cough, choking, chest tightness, shortness of breath, wheezing and stridor.    Cardiovascular: Negative for chest pain, palpitations and leg swelling.   Gastrointestinal: Negative for abdominal distention, change in bowel habit, diarrhea, rectal pain, vomiting, fecal incontinence and change in bowel habit.   Endocrine: Negative for cold intolerance, heat intolerance, polydipsia, polyphagia and polyuria.   Genitourinary: Negative for bladder incontinence, dysuria, flank pain, frequency and hot flashes.   Musculoskeletal: Positive for arthralgias, back pain, leg pain and neck pain.   Integumentary:  Negative for color change, pallor and rash.   Allergic/Immunologic: Negative for immunocompromised state.   Neurological: Negative for dizziness, vertigo, seizures, syncope, facial asymmetry, speech difficulty, light-headedness, disturbances in coordination, memory loss and coordination difficulties.   Hematological: Negative for adenopathy. Does not bruise/bleed easily.   Psychiatric/Behavioral: Negative for agitation, behavioral problems, confusion, decreased concentration, dysphoric mood, hallucinations, self-injury  "and suicidal ideas. The patient is not nervous/anxious and is not hyperactive.             Objective:  Vitals:    07/11/22 1403   BP: (!) 146/84   Pulse: 109   Resp: 18   Weight: 96.2 kg (212 lb)   Height: 5' 8" (1.727 m)   PainSc: 10-Worst pain ever         Physical Exam  Vitals and nursing note reviewed. Exam conducted with a chaperone present.   Constitutional:       General: She is awake. She is not in acute distress.     Appearance: Normal appearance. She is not ill-appearing.   HENT:      Head: Normocephalic and atraumatic.      Nose: Nose normal.      Mouth/Throat:      Mouth: Mucous membranes are moist.      Pharynx: Oropharynx is clear.   Eyes:      Conjunctiva/sclera: Conjunctivae normal.      Pupils: Pupils are equal, round, and reactive to light.   Cardiovascular:      Rate and Rhythm: Normal rate.   Pulmonary:      Effort: Pulmonary effort is normal. No respiratory distress.   Abdominal:      Palpations: Abdomen is soft.   Musculoskeletal:         General: Normal range of motion.      Right shoulder: Tenderness present.      Left shoulder: Tenderness present.      Right wrist: Tenderness present.      Left wrist: Tenderness present.      Cervical back: Normal range of motion and neck supple. Tenderness present.      Thoracic back: Tenderness present.      Lumbar back: Tenderness present.   Skin:     General: Skin is warm and dry.   Neurological:      General: No focal deficit present.      Mental Status: She is alert and oriented to person, place, and time. Mental status is at baseline.      Cranial Nerves: No cranial nerve deficit (II-XII).   Psychiatric:         Mood and Affect: Mood normal.         Behavior: Behavior normal. Behavior is cooperative.         Thought Content: Thought content normal.           Orders Placed This Encounter   Procedures    POCT Urine Drug Screen Presump     Interpretive Information:     Negative:  No drug detected at the cut off level.   Positive:  This result represents " presumptive positive for the   tested drug, other substances may yield a positive response other   than the analyte of interest. This result should be utilized for   diagnostic purpose only. Confirmation testing will be performed upon physician request only.           No image results found.       Office Visit on 03/04/2022   Component Date Value Ref Range Status    POC Amphetamines 03/04/2022 Negative  Negative, Inconclusive Final    POC Barbiturates 03/04/2022 Negative  Negative, Inconclusive Final    POC Benzodiazepines 03/04/2022 Negative  Negative, Inconclusive Final    POC Cocaine 03/04/2022 Negative  Negative, Inconclusive Final    POC THC 03/04/2022 Negative  Negative, Inconclusive Final    POC Methadone 03/04/2022 Negative  Negative, Inconclusive Final    POC Methamphetamine 03/04/2022 Negative  Negative, Inconclusive Final    POC Opiates 03/04/2022 Presumptive Positive (A) Negative, Inconclusive Final    POC Oxycodone 03/04/2022 Negative  Negative, Inconclusive Final    POC Phencyclidine 03/04/2022 Negative  Negative, Inconclusive Final    POC Methylenedioxymethamphetamine * 03/04/2022 Negative  Negative, Inconclusive Final    POC Tricyclic Antidepressants 03/04/2022 Negative  Negative, Inconclusive Final    POC Buprenorphine 03/04/2022 Negative   Final     Acceptable 03/04/2022 Yes   Final    POC Temperature (Urine) 03/04/2022 92   Final         Assessment:      1. Encounter for long-term (current) use of other medications    2. Cervical spondylosis without myelopathy    3. Lumbosacral spondylosis without myelopathy    4. Polyarthralgia            A's of Opioid Risk Assessment  Activity:Patient can perform ADL.   Analgesia:Patients pain is partially controlled by current medication. Patient has tried OTC medications such as Tylenol and Ibuprofen with out relief.   Adverse Effects: Patient denies constipation or sedation.  Aberrant Behavior:  reviewed with no aberrant drug  seeking/taking behavior.  Overdose reversal drug naloxone discussed    Drug screen reviewed      Requested Prescriptions     Signed Prescriptions Disp Refills    amitriptyline (ELAVIL) 50 MG tablet 90 tablet 0     Sig: Take 1 tablet (50 mg total) by mouth every evening.    diclofenac (VOLTAREN) 75 MG EC tablet 60 tablet 2     Sig: Take 1 tablet (75 mg total) by mouth 2 (two) times daily.    pregabalin (LYRICA) 150 MG capsule 270 capsule 0     Sig: Take 1 capsule (150 mg total) by mouth every 8 (eight) hours.    HYDROcodone-acetaminophen (NORCO) 7.5-325 mg per tablet 90 tablet 0     Sig: Take 1 tablet by mouth every 8 (eight) hours as needed for Pain.    HYDROcodone-acetaminophen (NORCO) 7.5-325 mg per tablet 90 tablet 0     Sig: Take 1 tablet by mouth every 8 (eight) hours as needed for Pain.    HYDROcodone-acetaminophen (NORCO) 7.5-325 mg per tablet 90 tablet 0     Sig: Take 1 tablet by mouth every 8 (eight) hours as needed for Pain.    tiZANidine (ZANAFLEX) 4 MG tablet 270 tablet 0     Sig: Take 1 tablet (4 mg total) by mouth every 8 (eight) hours.         Plan:    Continues complaint joint back pain continues following rheumatology    She states current medications helping control her discomfort    She is losing her insurance  For health care    She will try to obtain new insurance    Continue home exercise program as directed    Continue current medication    Follow-up 3 months 2    Dr. Velarde, May 2023    Bring original prescription medication bottles/container/box with labels to each visit

## 2022-07-11 ENCOUNTER — OFFICE VISIT (OUTPATIENT)
Dept: PAIN MEDICINE | Facility: CLINIC | Age: 37
End: 2022-07-11
Payer: COMMERCIAL

## 2022-07-11 VITALS
RESPIRATION RATE: 18 BRPM | BODY MASS INDEX: 32.13 KG/M2 | WEIGHT: 212 LBS | HEART RATE: 109 BPM | DIASTOLIC BLOOD PRESSURE: 84 MMHG | HEIGHT: 68 IN | SYSTOLIC BLOOD PRESSURE: 146 MMHG

## 2022-07-11 DIAGNOSIS — Z79.899 ENCOUNTER FOR LONG-TERM (CURRENT) USE OF OTHER MEDICATIONS: Primary | ICD-10-CM

## 2022-07-11 DIAGNOSIS — M47.817 LUMBOSACRAL SPONDYLOSIS WITHOUT MYELOPATHY: Chronic | ICD-10-CM

## 2022-07-11 DIAGNOSIS — M25.50 POLYARTHRALGIA: Chronic | ICD-10-CM

## 2022-07-11 DIAGNOSIS — M47.812 CERVICAL SPONDYLOSIS WITHOUT MYELOPATHY: Chronic | ICD-10-CM

## 2022-07-11 PROCEDURE — 99214 OFFICE O/P EST MOD 30 MIN: CPT | Mod: S$PBB,,, | Performed by: PHYSICIAN ASSISTANT

## 2022-07-11 PROCEDURE — 3008F PR BODY MASS INDEX (BMI) DOCUMENTED: ICD-10-PCS | Mod: CPTII,,, | Performed by: PHYSICIAN ASSISTANT

## 2022-07-11 PROCEDURE — 1159F MED LIST DOCD IN RCRD: CPT | Mod: CPTII,,, | Performed by: PHYSICIAN ASSISTANT

## 2022-07-11 PROCEDURE — 3077F PR MOST RECENT SYSTOLIC BLOOD PRESSURE >= 140 MM HG: ICD-10-PCS | Mod: CPTII,,, | Performed by: PHYSICIAN ASSISTANT

## 2022-07-11 PROCEDURE — 3077F SYST BP >= 140 MM HG: CPT | Mod: CPTII,,, | Performed by: PHYSICIAN ASSISTANT

## 2022-07-11 PROCEDURE — 99214 PR OFFICE/OUTPT VISIT, EST, LEVL IV, 30-39 MIN: ICD-10-PCS | Mod: S$PBB,,, | Performed by: PHYSICIAN ASSISTANT

## 2022-07-11 PROCEDURE — 80305 DRUG TEST PRSMV DIR OPT OBS: CPT | Mod: PBBFAC | Performed by: PHYSICIAN ASSISTANT

## 2022-07-11 PROCEDURE — 99213 OFFICE O/P EST LOW 20 MIN: CPT | Mod: PBBFAC | Performed by: PHYSICIAN ASSISTANT

## 2022-07-11 PROCEDURE — 3079F PR MOST RECENT DIASTOLIC BLOOD PRESSURE 80-89 MM HG: ICD-10-PCS | Mod: CPTII,,, | Performed by: PHYSICIAN ASSISTANT

## 2022-07-11 PROCEDURE — 1159F PR MEDICATION LIST DOCUMENTED IN MEDICAL RECORD: ICD-10-PCS | Mod: CPTII,,, | Performed by: PHYSICIAN ASSISTANT

## 2022-07-11 PROCEDURE — 3008F BODY MASS INDEX DOCD: CPT | Mod: CPTII,,, | Performed by: PHYSICIAN ASSISTANT

## 2022-07-11 PROCEDURE — 3079F DIAST BP 80-89 MM HG: CPT | Mod: CPTII,,, | Performed by: PHYSICIAN ASSISTANT

## 2022-07-11 RX ORDER — TIZANIDINE 4 MG/1
4 TABLET ORAL EVERY 8 HOURS
Qty: 270 TABLET | Refills: 0 | Status: SHIPPED | OUTPATIENT
Start: 2022-07-11 | End: 2022-10-05 | Stop reason: SDUPTHER

## 2022-07-11 RX ORDER — HYDROCODONE BITARTRATE AND ACETAMINOPHEN 7.5; 325 MG/1; MG/1
1 TABLET ORAL EVERY 8 HOURS PRN
COMMUNITY
End: 2022-10-05 | Stop reason: SDUPTHER

## 2022-07-11 RX ORDER — PREGABALIN 150 MG/1
150 CAPSULE ORAL EVERY 8 HOURS
Qty: 270 CAPSULE | Refills: 0 | Status: SHIPPED | OUTPATIENT
Start: 2022-07-11 | End: 2022-10-05 | Stop reason: SDUPTHER

## 2022-07-11 RX ORDER — HYDROCODONE BITARTRATE AND ACETAMINOPHEN 7.5; 325 MG/1; MG/1
1 TABLET ORAL EVERY 8 HOURS PRN
Qty: 90 TABLET | Refills: 0 | Status: SHIPPED | OUTPATIENT
Start: 2022-08-10 | End: 2022-09-09

## 2022-07-11 RX ORDER — HYDROCODONE BITARTRATE AND ACETAMINOPHEN 7.5; 325 MG/1; MG/1
1 TABLET ORAL EVERY 8 HOURS PRN
Qty: 90 TABLET | Refills: 0 | Status: SHIPPED | OUTPATIENT
Start: 2022-07-11 | End: 2022-08-10

## 2022-07-11 RX ORDER — DICLOFENAC SODIUM 75 MG/1
75 TABLET, DELAYED RELEASE ORAL 2 TIMES DAILY
Qty: 60 TABLET | Refills: 2 | Status: SHIPPED | OUTPATIENT
Start: 2022-07-11 | End: 2022-10-05 | Stop reason: SDUPTHER

## 2022-07-11 RX ORDER — AMITRIPTYLINE HYDROCHLORIDE 50 MG/1
50 TABLET, FILM COATED ORAL NIGHTLY
Qty: 90 TABLET | Refills: 0 | Status: SHIPPED | OUTPATIENT
Start: 2022-07-11 | End: 2022-10-05 | Stop reason: SDUPTHER

## 2022-07-11 RX ORDER — HYDROCODONE BITARTRATE AND ACETAMINOPHEN 7.5; 325 MG/1; MG/1
1 TABLET ORAL EVERY 8 HOURS PRN
Qty: 90 TABLET | Refills: 0 | Status: SHIPPED | OUTPATIENT
Start: 2022-09-09 | End: 2022-10-05 | Stop reason: SDUPTHER

## 2022-10-05 NOTE — PROGRESS NOTES
Subjective:         Patient ID: Monserrat Lee is a 37 y.o. female.    Chief Complaint: Low-back Pain      Pain  This is a chronic problem. The current episode started more than 1 year ago. The problem occurs daily. The problem has been waxing and waning. Associated symptoms include arthralgias and neck pain. Pertinent negatives include no anorexia, change in bowel habit, chest pain, coughing, diaphoresis, fever, rash, sore throat, vertigo or vomiting.   Review of Systems   Constitutional:  Negative for activity change, appetite change, diaphoresis, fever and unexpected weight change.   HENT:  Negative for drooling, ear discharge, ear pain, facial swelling, nosebleeds, sore throat, trouble swallowing, voice change and goiter.    Eyes:  Negative for photophobia, pain, discharge, redness and visual disturbance.   Respiratory:  Negative for apnea, cough, choking, chest tightness, shortness of breath, wheezing and stridor.    Cardiovascular:  Negative for chest pain, palpitations and leg swelling.   Gastrointestinal:  Negative for abdominal distention, anorexia, change in bowel habit, diarrhea, rectal pain, vomiting, fecal incontinence and change in bowel habit.   Endocrine: Negative for cold intolerance, heat intolerance, polydipsia, polyphagia and polyuria.   Genitourinary:  Negative for bladder incontinence, dysuria, flank pain, frequency and hot flashes.   Musculoskeletal:  Positive for arthralgias, back pain, leg pain and neck pain.   Integumentary:  Negative for color change, pallor and rash.   Allergic/Immunologic: Negative for immunocompromised state.   Neurological:  Negative for dizziness, vertigo, seizures, syncope, facial asymmetry, speech difficulty, light-headedness, coordination difficulties, memory loss and coordination difficulties.   Hematological:  Negative for adenopathy. Does not bruise/bleed easily.   Psychiatric/Behavioral:  Negative for agitation, behavioral problems, confusion, decreased  "concentration, dysphoric mood, hallucinations, self-injury and suicidal ideas. The patient is not nervous/anxious and is not hyperactive.          Past Medical History:   Diagnosis Date    Anxiety disorder     Carpal tunnel syndrome     Cervical radiculopathy     Cervical spondylosis without myelopathy     Chronic pain syndrome     Depressive disorder     Herpes simplex     Insomnia     Low back pain     Lumbosacral radiculopathy     Sacroiliitis, not elsewhere classified      Past Surgical History:   Procedure Laterality Date    FOOT SURGERY Left     Cyst Removal Left Foot    TUBAL LIGATION  2008     Social History     Socioeconomic History    Marital status:    Tobacco Use    Smoking status: Every Day     Packs/day: 1.00     Types: Cigarettes    Smokeless tobacco: Never   Substance and Sexual Activity    Alcohol use: Not Currently    Drug use: Yes     Types: Hydrocodone     Family History   Problem Relation Age of Onset    Hypertension Father      Review of patient's allergies indicates:  No Known Allergies     Objective:  Vitals:    10/06/22 0928 10/06/22 0929   BP: 126/86    Pulse: 76    Weight: 92.1 kg (203 lb)    Height: 5' 10.8" (1.798 m)    PainSc:   4   4         Physical Exam  Vitals and nursing note reviewed. Exam conducted with a chaperone present.   Constitutional:       General: She is awake. She is not in acute distress.     Appearance: Normal appearance. She is not ill-appearing or diaphoretic.   HENT:      Head: Normocephalic and atraumatic.      Nose: Nose normal.      Mouth/Throat:      Mouth: Mucous membranes are moist.      Pharynx: Oropharynx is clear.   Eyes:      Conjunctiva/sclera: Conjunctivae normal.      Pupils: Pupils are equal, round, and reactive to light.   Cardiovascular:      Rate and Rhythm: Normal rate.   Pulmonary:      Effort: Pulmonary effort is normal. No respiratory distress.   Abdominal:      Palpations: Abdomen is soft.   Musculoskeletal:         General: Normal " range of motion.      Right shoulder: Tenderness present.      Left shoulder: Tenderness present.      Right wrist: Tenderness present.      Left wrist: Tenderness present.      Cervical back: Normal range of motion and neck supple. Tenderness present.      Thoracic back: Tenderness present.      Lumbar back: Tenderness present.   Skin:     General: Skin is warm and dry.   Neurological:      General: No focal deficit present.      Mental Status: She is alert and oriented to person, place, and time. Mental status is at baseline.      Cranial Nerves: No cranial nerve deficit (II-XII).   Psychiatric:         Mood and Affect: Mood normal.         Behavior: Behavior normal. Behavior is cooperative.         Thought Content: Thought content normal.         No image results found.       Office Visit on 07/11/2022   Component Date Value Ref Range Status    POC Amphetamines 07/11/2022 Negative  Negative, Inconclusive Final    POC Barbiturates 07/11/2022 Negative  Negative, Inconclusive Final    POC Benzodiazepines 07/11/2022 Negative  Negative, Inconclusive Final    POC Cocaine 07/11/2022 Negative  Negative, Inconclusive Final    POC THC 07/11/2022 Negative  Negative, Inconclusive Final    POC Methadone 07/11/2022 Negative  Negative, Inconclusive Final    POC Methamphetamine 07/11/2022 Negative  Negative, Inconclusive Final    POC Opiates 07/11/2022 Presumptive Positive (A)  Negative, Inconclusive Final    POC Oxycodone 07/11/2022 Negative  Negative, Inconclusive Final    POC Phencyclidine 07/11/2022 Negative  Negative, Inconclusive Final    POC Methylenedioxymethamphetamine * 07/11/2022 Negative  Negative, Inconclusive Final    POC Tricyclic Antidepressants 07/11/2022 Negative  Negative, Inconclusive Final    POC Buprenorphine 07/11/2022 Negative   Final     Acceptable 07/11/2022 Yes   Final    POC Temperature (Urine) 07/11/2022 92   Final         Orders Placed This Encounter   Procedures    POCT Urine Drug  Screen Presump     Interpretive Information:     Negative:  No drug detected at the cut off level.   Positive:  This result represents presumptive positive for the   tested drug, other substances may yield a positive response other   than the analyte of interest. This result should be utilized for   diagnostic purpose only. Confirmation testing will be performed upon physician request only.          Requested Prescriptions     Signed Prescriptions Disp Refills    tiZANidine (ZANAFLEX) 4 MG tablet 270 tablet 0     Sig: Take 1 tablet (4 mg total) by mouth every 8 (eight) hours.    pregabalin (LYRICA) 150 MG capsule 270 capsule 0     Sig: Take 1 capsule (150 mg total) by mouth every 8 (eight) hours.    diclofenac (VOLTAREN) 75 MG EC tablet 60 tablet 2     Sig: Take 1 tablet (75 mg total) by mouth 2 (two) times daily.    amitriptyline (ELAVIL) 50 MG tablet 90 tablet 0     Sig: Take 1 tablet (50 mg total) by mouth every evening.    HYDROcodone-acetaminophen (NORCO) 7.5-325 mg per tablet 90 tablet 0     Sig: Take 1 tablet by mouth every 8 (eight) hours as needed for Pain.    HYDROcodone-acetaminophen (NORCO) 7.5-325 mg per tablet 90 tablet 0     Sig: Take 1 tablet by mouth every 8 (eight) hours as needed for Pain.       Assessment:     1. Lumbosacral spondylosis without myelopathy    2. Cervical spondylosis without myelopathy    3. Polyarthralgia    4. Encounter for long-term (current) use of other medications         A's of Opioid Risk Assessment  Activity:Patient can perform ADL.   Analgesia:Patients pain is partially controlled by current medication. Patient has tried OTC medications such as Tylenol and Ibuprofen with out relief.   Adverse Effects: Patient denies constipation or sedation.  Aberrant Behavior:  reviewed with no aberrant drug seeking/taking behavior.  Overdose reversal drug naloxone discussed    Drug screen reviewed      Plan:    Following rheumatology    No new complaints doing quite well current  medication is helping control her discomfort    She would like to continue conservative management    Continue home exercise program as directed    Continue current medication    Follow-up 3 months 2    Dr. Velarde, May 2023    Bring original prescription medication bottles/container/box with labels to each visit    Pill count    Physical therapy    Massage therapy declines

## 2022-10-06 ENCOUNTER — OFFICE VISIT (OUTPATIENT)
Dept: PAIN MEDICINE | Facility: CLINIC | Age: 37
End: 2022-10-06
Payer: COMMERCIAL

## 2022-10-06 VITALS
HEART RATE: 76 BPM | DIASTOLIC BLOOD PRESSURE: 86 MMHG | BODY MASS INDEX: 28.42 KG/M2 | WEIGHT: 203 LBS | HEIGHT: 71 IN | SYSTOLIC BLOOD PRESSURE: 126 MMHG

## 2022-10-06 DIAGNOSIS — Z79.899 ENCOUNTER FOR LONG-TERM (CURRENT) USE OF OTHER MEDICATIONS: ICD-10-CM

## 2022-10-06 DIAGNOSIS — M25.50 POLYARTHRALGIA: Chronic | ICD-10-CM

## 2022-10-06 DIAGNOSIS — M47.812 CERVICAL SPONDYLOSIS WITHOUT MYELOPATHY: Chronic | ICD-10-CM

## 2022-10-06 DIAGNOSIS — M47.817 LUMBOSACRAL SPONDYLOSIS WITHOUT MYELOPATHY: Primary | Chronic | ICD-10-CM

## 2022-10-06 LAB
CTP QC/QA: YES
POC (AMP) AMPHETAMINE: NEGATIVE
POC (BAR) BARBITURATES: NEGATIVE
POC (BUP) BUPRENORPHINE: NEGATIVE
POC (BZO) BENZODIAZEPINES: NEGATIVE
POC (COC) COCAINE: NEGATIVE
POC (MDMA) METHYLENEDIOXYMETHAMPHETAMINE 3,4: NEGATIVE
POC (MET) METHAMPHETAMINE: NEGATIVE
POC (MOP) OPIATES: ABNORMAL
POC (MTD) METHADONE: NEGATIVE
POC (OXY) OXYCODONE: NEGATIVE
POC (PCP) PHENCYCLIDINE: NEGATIVE
POC (TCA) TRICYCLIC ANTIDEPRESSANTS: NEGATIVE
POC TEMPERATURE (URINE): 0
POC THC: NEGATIVE

## 2022-10-06 PROCEDURE — 80305 DRUG TEST PRSMV DIR OPT OBS: CPT | Mod: PBBFAC | Performed by: PHYSICIAN ASSISTANT

## 2022-10-06 PROCEDURE — 99213 OFFICE O/P EST LOW 20 MIN: CPT | Mod: PBBFAC | Performed by: PHYSICIAN ASSISTANT

## 2022-10-06 PROCEDURE — 3074F PR MOST RECENT SYSTOLIC BLOOD PRESSURE < 130 MM HG: ICD-10-PCS | Mod: CPTII,,, | Performed by: PHYSICIAN ASSISTANT

## 2022-10-06 PROCEDURE — 1159F PR MEDICATION LIST DOCUMENTED IN MEDICAL RECORD: ICD-10-PCS | Mod: CPTII,,, | Performed by: PHYSICIAN ASSISTANT

## 2022-10-06 PROCEDURE — 3079F DIAST BP 80-89 MM HG: CPT | Mod: CPTII,,, | Performed by: PHYSICIAN ASSISTANT

## 2022-10-06 PROCEDURE — 1159F MED LIST DOCD IN RCRD: CPT | Mod: CPTII,,, | Performed by: PHYSICIAN ASSISTANT

## 2022-10-06 PROCEDURE — 3008F BODY MASS INDEX DOCD: CPT | Mod: CPTII,,, | Performed by: PHYSICIAN ASSISTANT

## 2022-10-06 PROCEDURE — 3074F SYST BP LT 130 MM HG: CPT | Mod: CPTII,,, | Performed by: PHYSICIAN ASSISTANT

## 2022-10-06 PROCEDURE — 3008F PR BODY MASS INDEX (BMI) DOCUMENTED: ICD-10-PCS | Mod: CPTII,,, | Performed by: PHYSICIAN ASSISTANT

## 2022-10-06 PROCEDURE — 3079F PR MOST RECENT DIASTOLIC BLOOD PRESSURE 80-89 MM HG: ICD-10-PCS | Mod: CPTII,,, | Performed by: PHYSICIAN ASSISTANT

## 2022-10-06 PROCEDURE — 99214 OFFICE O/P EST MOD 30 MIN: CPT | Mod: S$PBB,,, | Performed by: PHYSICIAN ASSISTANT

## 2022-10-06 PROCEDURE — 99214 PR OFFICE/OUTPT VISIT, EST, LEVL IV, 30-39 MIN: ICD-10-PCS | Mod: S$PBB,,, | Performed by: PHYSICIAN ASSISTANT

## 2022-10-06 RX ORDER — TIZANIDINE 4 MG/1
4 TABLET ORAL EVERY 8 HOURS
Qty: 270 TABLET | Refills: 0 | Status: SHIPPED | OUTPATIENT
Start: 2022-10-06 | End: 2022-11-30 | Stop reason: SDUPTHER

## 2022-10-06 RX ORDER — DICLOFENAC SODIUM 75 MG/1
75 TABLET, DELAYED RELEASE ORAL 2 TIMES DAILY
Qty: 60 TABLET | Refills: 2 | Status: SHIPPED | OUTPATIENT
Start: 2022-10-06 | End: 2022-11-30 | Stop reason: SDUPTHER

## 2022-10-06 RX ORDER — PREGABALIN 150 MG/1
150 CAPSULE ORAL EVERY 8 HOURS
Qty: 270 CAPSULE | Refills: 0 | Status: SHIPPED | OUTPATIENT
Start: 2022-10-06 | End: 2022-11-30 | Stop reason: SDUPTHER

## 2022-10-06 RX ORDER — AMITRIPTYLINE HYDROCHLORIDE 50 MG/1
50 TABLET, FILM COATED ORAL NIGHTLY
Qty: 90 TABLET | Refills: 0 | Status: SHIPPED | OUTPATIENT
Start: 2022-10-06 | End: 2022-11-30 | Stop reason: SDUPTHER

## 2022-10-06 RX ORDER — HYDROCODONE BITARTRATE AND ACETAMINOPHEN 7.5; 325 MG/1; MG/1
1 TABLET ORAL EVERY 8 HOURS PRN
Qty: 90 TABLET | Refills: 0 | Status: SHIPPED | OUTPATIENT
Start: 2022-11-04 | End: 2022-10-06

## 2022-10-06 RX ORDER — HYDROCODONE BITARTRATE AND ACETAMINOPHEN 7.5; 325 MG/1; MG/1
1 TABLET ORAL EVERY 8 HOURS PRN
Qty: 90 TABLET | Refills: 0 | Status: SHIPPED | OUTPATIENT
Start: 2022-11-10 | End: 2022-11-30 | Stop reason: SDUPTHER

## 2022-10-06 RX ORDER — HYDROCODONE BITARTRATE AND ACETAMINOPHEN 7.5; 325 MG/1; MG/1
1 TABLET ORAL EVERY 8 HOURS PRN
Qty: 90 TABLET | Refills: 0 | Status: SHIPPED | OUTPATIENT
Start: 2022-10-11 | End: 2022-11-10

## 2022-11-30 NOTE — PROGRESS NOTES
Subjective:         Patient ID: Monserrat Lee is a 37 y.o. female.    Chief Complaint: Low-back Pain      Pain  This is a chronic problem. The current episode started more than 1 year ago. The problem occurs daily. The problem has been unchanged. Associated symptoms include arthralgias and neck pain. Pertinent negatives include no anorexia, change in bowel habit, chest pain, coughing, diaphoresis, fever, sore throat, vertigo or vomiting.   Review of Systems   Constitutional:  Negative for activity change, appetite change, diaphoresis, fever and unexpected weight change.   HENT:  Negative for drooling, ear discharge, ear pain, facial swelling, nosebleeds, sore throat, trouble swallowing, voice change and goiter.    Eyes:  Negative for photophobia, pain, discharge, redness and visual disturbance.   Respiratory:  Negative for apnea, cough, choking, chest tightness, shortness of breath, wheezing and stridor.    Cardiovascular:  Negative for chest pain, palpitations and leg swelling.   Gastrointestinal:  Negative for abdominal distention, anorexia, change in bowel habit, diarrhea, rectal pain, vomiting, fecal incontinence and change in bowel habit.   Endocrine: Negative for cold intolerance, heat intolerance, polydipsia, polyphagia and polyuria.   Genitourinary:  Negative for bladder incontinence, dysuria, flank pain, frequency and hot flashes.   Musculoskeletal:  Positive for arthralgias, back pain, leg pain and neck pain.   Integumentary:  Negative for color change and pallor.   Allergic/Immunologic: Negative for immunocompromised state.   Neurological:  Negative for dizziness, vertigo, seizures, syncope, facial asymmetry, speech difficulty, light-headedness, coordination difficulties, memory loss and coordination difficulties.   Hematological:  Negative for adenopathy. Does not bruise/bleed easily.   Psychiatric/Behavioral:  Negative for agitation, behavioral problems, confusion, decreased concentration, dysphoric  "mood, hallucinations, self-injury and suicidal ideas. The patient is not nervous/anxious and is not hyperactive.          Past Medical History:   Diagnosis Date    Anxiety disorder     Carpal tunnel syndrome     Cervical radiculopathy     Cervical spondylosis without myelopathy     Chronic pain syndrome     Depressive disorder     Herpes simplex     Insomnia     Low back pain     Lumbosacral radiculopathy     Sacroiliitis, not elsewhere classified      Past Surgical History:   Procedure Laterality Date    FOOT SURGERY Left     Cyst Removal Left Foot    TUBAL LIGATION  2008     Social History     Socioeconomic History    Marital status:    Tobacco Use    Smoking status: Every Day     Packs/day: 1.00     Types: Cigarettes    Smokeless tobacco: Never   Substance and Sexual Activity    Alcohol use: Not Currently    Drug use: Yes     Types: Hydrocodone     Family History   Problem Relation Age of Onset    Hypertension Father      Review of patient's allergies indicates:  No Known Allergies     Objective:  Vitals:    12/05/22 0930   Resp: 16   Weight: 90.7 kg (200 lb)   Height: 5' 9" (1.753 m)   PainSc:   4         Physical Exam  Vitals and nursing note reviewed. Exam conducted with a chaperone present.   Constitutional:       General: She is awake. She is not in acute distress.     Appearance: Normal appearance. She is not ill-appearing or diaphoretic.   HENT:      Head: Normocephalic and atraumatic.      Nose: Nose normal.      Mouth/Throat:      Mouth: Mucous membranes are moist.      Pharynx: Oropharynx is clear.   Eyes:      Conjunctiva/sclera: Conjunctivae normal.      Pupils: Pupils are equal, round, and reactive to light.   Cardiovascular:      Rate and Rhythm: Normal rate.   Pulmonary:      Effort: Pulmonary effort is normal. No respiratory distress.   Abdominal:      Palpations: Abdomen is soft.   Musculoskeletal:         General: Normal range of motion.      Right shoulder: Tenderness present.      Left " shoulder: Tenderness present.      Right wrist: Tenderness present.      Left wrist: Tenderness present.      Cervical back: Normal range of motion and neck supple. Tenderness present.      Thoracic back: Tenderness present.      Lumbar back: Tenderness present.   Skin:     General: Skin is warm and dry.   Neurological:      General: No focal deficit present.      Mental Status: She is alert and oriented to person, place, and time. Mental status is at baseline.      Cranial Nerves: No cranial nerve deficit (II-XII).   Psychiatric:         Mood and Affect: Mood normal.         Behavior: Behavior normal. Behavior is cooperative.         Thought Content: Thought content normal.         No image results found.       Office Visit on 10/06/2022   Component Date Value Ref Range Status    POC Amphetamines 10/06/2022 Negative  Negative, Inconclusive Final    POC Barbiturates 10/06/2022 Negative  Negative, Inconclusive Final    POC Benzodiazepines 10/06/2022 Negative  Negative, Inconclusive Final    POC Cocaine 10/06/2022 Negative  Negative, Inconclusive Final    POC THC 10/06/2022 Negative  Negative, Inconclusive Final    POC Methadone 10/06/2022 Negative  Negative, Inconclusive Final    POC Methamphetamine 10/06/2022 Negative  Negative, Inconclusive Final    POC Opiates 10/06/2022 Presumptive Positive (A)  Negative, Inconclusive Final    POC Oxycodone 10/06/2022 Negative  Negative, Inconclusive Final    POC Phencyclidine 10/06/2022 Negative  Negative, Inconclusive Final    POC Methylenedioxymethamphetamine * 10/06/2022 Negative  Negative, Inconclusive Final    POC Tricyclic Antidepressants 10/06/2022 Negative  Negative, Inconclusive Final    POC Buprenorphine 10/06/2022 Negative   Final     Acceptable 10/06/2022 Yes   Final    POC Temperature (Urine) 10/06/2022 0   Final   Office Visit on 07/11/2022   Component Date Value Ref Range Status    POC Amphetamines 07/11/2022 Negative  Negative, Inconclusive Final     POC Barbiturates 2022 Negative  Negative, Inconclusive Final    POC Benzodiazepines 2022 Negative  Negative, Inconclusive Final    POC Cocaine 2022 Negative  Negative, Inconclusive Final    POC THC 2022 Negative  Negative, Inconclusive Final    POC Methadone 2022 Negative  Negative, Inconclusive Final    POC Methamphetamine 2022 Negative  Negative, Inconclusive Final    POC Opiates 2022 Presumptive Positive (A)  Negative, Inconclusive Final    POC Oxycodone 2022 Negative  Negative, Inconclusive Final    POC Phencyclidine 2022 Negative  Negative, Inconclusive Final    POC Methylenedioxymethamphetamine * 2022 Negative  Negative, Inconclusive Final    POC Tricyclic Antidepressants 2022 Negative  Negative, Inconclusive Final    POC Buprenorphine 2022 Negative   Final     Acceptable 2022 Yes   Final    POC Temperature (Urine) 2022 92   Final         No orders of the defined types were placed in this encounter.      Requested Prescriptions     Signed Prescriptions Disp Refills    amitriptyline (ELAVIL) 50 MG tablet 90 tablet 0     Sig: Take 1 tablet (50 mg total) by mouth every evening.    diclofenac (VOLTAREN) 75 MG EC tablet 60 tablet 2     Sig: Take 1 tablet (75 mg total) by mouth 2 (two) times daily.    tiZANidine (ZANAFLEX) 4 MG tablet 270 tablet 0     Sig: Take 1 tablet (4 mg total) by mouth every 8 (eight) hours.    pregabalin (LYRICA) 150 MG capsule 270 capsule 0     Sig: Take 1 capsule (150 mg total) by mouth every 8 (eight) hours.    HYDROcodone-acetaminophen (NORCO) 7.5-325 mg per tablet 90 tablet 0     Sig: Take 1 tablet by mouth every 8 (eight) hours as needed for Pain.    naloxone (NARCAN) 4 mg/actuation Spry 1 each 0     Si spray (4 mg total) by Nasal route once. for 1 dose    HYDROcodone-acetaminophen (NORCO) 7.5-325 mg per tablet 90 tablet 0     Sig: Take 1 tablet by mouth every 8 (eight) hours as  needed for Pain.       Assessment:     1. Cervical spondylosis without myelopathy    2. Lumbosacral spondylosis without myelopathy    3. Polyarthralgia         A's of Opioid Risk Assessment  Activity:Patient can perform ADL.   Analgesia:Patients pain is partially controlled by current medication. Patient has tried OTC medications such as Tylenol and Ibuprofen with out relief.   Adverse Effects: Patient denies constipation or sedation.  Aberrant Behavior:  reviewed with no aberrant drug seeking/taking behavior.  Overdose reversal drug naloxone discussed    Drug screen reviewed      Plan:    Narcan December 2022    Following rheumatology    No new complaints doing quite well current medication is helping control her discomfort    She would like to continue conservative management    Continue home exercise program as directed    Continue current medication    Follow-up 2 months     Dr. Velarde, May 2023    Bring original prescription medication bottles/container/box with labels to each visit    Pill count    Physical therapy    Massage therapy declines

## 2022-12-05 ENCOUNTER — OFFICE VISIT (OUTPATIENT)
Dept: PAIN MEDICINE | Facility: CLINIC | Age: 37
End: 2022-12-05
Payer: COMMERCIAL

## 2022-12-05 VITALS — WEIGHT: 200 LBS | RESPIRATION RATE: 16 BRPM | HEIGHT: 69 IN | BODY MASS INDEX: 29.62 KG/M2

## 2022-12-05 DIAGNOSIS — M47.817 LUMBOSACRAL SPONDYLOSIS WITHOUT MYELOPATHY: Chronic | ICD-10-CM

## 2022-12-05 DIAGNOSIS — M47.812 CERVICAL SPONDYLOSIS WITHOUT MYELOPATHY: Chronic | ICD-10-CM

## 2022-12-05 DIAGNOSIS — M25.50 POLYARTHRALGIA: Chronic | ICD-10-CM

## 2022-12-05 PROCEDURE — 99214 OFFICE O/P EST MOD 30 MIN: CPT | Mod: S$PBB,,, | Performed by: PHYSICIAN ASSISTANT

## 2022-12-05 PROCEDURE — 1159F PR MEDICATION LIST DOCUMENTED IN MEDICAL RECORD: ICD-10-PCS | Mod: CPTII,,, | Performed by: PHYSICIAN ASSISTANT

## 2022-12-05 PROCEDURE — 1159F MED LIST DOCD IN RCRD: CPT | Mod: CPTII,,, | Performed by: PHYSICIAN ASSISTANT

## 2022-12-05 PROCEDURE — 99213 OFFICE O/P EST LOW 20 MIN: CPT | Mod: PBBFAC | Performed by: PHYSICIAN ASSISTANT

## 2022-12-05 PROCEDURE — 99214 PR OFFICE/OUTPT VISIT, EST, LEVL IV, 30-39 MIN: ICD-10-PCS | Mod: S$PBB,,, | Performed by: PHYSICIAN ASSISTANT

## 2022-12-05 PROCEDURE — 3008F BODY MASS INDEX DOCD: CPT | Mod: CPTII,,, | Performed by: PHYSICIAN ASSISTANT

## 2022-12-05 PROCEDURE — 3008F PR BODY MASS INDEX (BMI) DOCUMENTED: ICD-10-PCS | Mod: CPTII,,, | Performed by: PHYSICIAN ASSISTANT

## 2022-12-05 RX ORDER — HYDROCODONE BITARTRATE AND ACETAMINOPHEN 7.5; 325 MG/1; MG/1
1 TABLET ORAL EVERY 8 HOURS PRN
Qty: 90 TABLET | Refills: 0 | Status: SHIPPED | OUTPATIENT
Start: 2022-12-11 | End: 2022-12-12 | Stop reason: SDUPTHER

## 2022-12-05 RX ORDER — LISDEXAMFETAMINE DIMESYLATE 50 MG/1
50 CAPSULE ORAL EVERY MORNING
COMMUNITY

## 2022-12-05 RX ORDER — AMITRIPTYLINE HYDROCHLORIDE 50 MG/1
50 TABLET, FILM COATED ORAL NIGHTLY
Qty: 90 TABLET | Refills: 0 | Status: SHIPPED | OUTPATIENT
Start: 2022-12-05 | End: 2023-02-06 | Stop reason: SDUPTHER

## 2022-12-05 RX ORDER — TIZANIDINE 4 MG/1
4 TABLET ORAL EVERY 8 HOURS
Qty: 270 TABLET | Refills: 0 | Status: SHIPPED | OUTPATIENT
Start: 2022-12-05 | End: 2023-02-06 | Stop reason: SDUPTHER

## 2022-12-05 RX ORDER — HYDROCODONE BITARTRATE AND ACETAMINOPHEN 7.5; 325 MG/1; MG/1
1 TABLET ORAL EVERY 8 HOURS PRN
Qty: 90 TABLET | Refills: 0 | Status: SHIPPED | OUTPATIENT
Start: 2023-01-10 | End: 2023-02-06 | Stop reason: SDUPTHER

## 2022-12-05 RX ORDER — NALOXONE HYDROCHLORIDE 4 MG/.1ML
1 SPRAY NASAL ONCE
Qty: 1 EACH | Refills: 0 | Status: SHIPPED | OUTPATIENT
Start: 2022-12-05 | End: 2022-12-05

## 2022-12-05 RX ORDER — DICLOFENAC SODIUM 75 MG/1
75 TABLET, DELAYED RELEASE ORAL 2 TIMES DAILY
Qty: 60 TABLET | Refills: 2 | Status: SHIPPED | OUTPATIENT
Start: 2022-12-05 | End: 2023-02-06 | Stop reason: SDUPTHER

## 2022-12-05 RX ORDER — PREGABALIN 150 MG/1
150 CAPSULE ORAL EVERY 8 HOURS
Qty: 270 CAPSULE | Refills: 0 | Status: SHIPPED | OUTPATIENT
Start: 2022-12-05 | End: 2023-02-06 | Stop reason: SDUPTHER

## 2022-12-12 DIAGNOSIS — M47.812 CERVICAL SPONDYLOSIS WITHOUT MYELOPATHY: Chronic | ICD-10-CM

## 2022-12-12 DIAGNOSIS — M47.817 LUMBOSACRAL SPONDYLOSIS WITHOUT MYELOPATHY: Chronic | ICD-10-CM

## 2022-12-12 DIAGNOSIS — M25.50 POLYARTHRALGIA: Chronic | ICD-10-CM

## 2022-12-12 RX ORDER — HYDROCODONE BITARTRATE AND ACETAMINOPHEN 7.5; 325 MG/1; MG/1
1 TABLET ORAL EVERY 8 HOURS PRN
Qty: 90 TABLET | Refills: 0 | Status: SHIPPED | OUTPATIENT
Start: 2022-12-12 | End: 2023-02-06 | Stop reason: SDUPTHER

## 2022-12-12 NOTE — TELEPHONE ENCOUNTER
----- Message from Noy Rojas sent at 12/12/2022  7:55 AM CST -----  Regarding: Rx not available at pharmacy  Patient's pharmacy does not have Norco & does not know when pharmacy will have it. Wants Rx sent to [facility] in [place]. If any questions call pt. @ [phone]

## 2023-02-06 ENCOUNTER — OFFICE VISIT (OUTPATIENT)
Dept: PAIN MEDICINE | Facility: CLINIC | Age: 38
End: 2023-02-06
Payer: COMMERCIAL

## 2023-02-06 VITALS
WEIGHT: 199 LBS | BODY MASS INDEX: 29.47 KG/M2 | HEIGHT: 69 IN | DIASTOLIC BLOOD PRESSURE: 83 MMHG | HEART RATE: 85 BPM | SYSTOLIC BLOOD PRESSURE: 133 MMHG

## 2023-02-06 DIAGNOSIS — M25.50 POLYARTHRALGIA: Chronic | ICD-10-CM

## 2023-02-06 DIAGNOSIS — M47.817 LUMBOSACRAL SPONDYLOSIS WITHOUT MYELOPATHY: Primary | Chronic | ICD-10-CM

## 2023-02-06 DIAGNOSIS — Z79.899 ENCOUNTER FOR LONG-TERM (CURRENT) USE OF OTHER MEDICATIONS: ICD-10-CM

## 2023-02-06 DIAGNOSIS — M47.812 CERVICAL SPONDYLOSIS WITHOUT MYELOPATHY: Chronic | ICD-10-CM

## 2023-02-06 LAB
CTP QC/QA: YES
POC (AMP) AMPHETAMINE: ABNORMAL
POC (BAR) BARBITURATES: NEGATIVE
POC (BUP) BUPRENORPHINE: NEGATIVE
POC (BZO) BENZODIAZEPINES: NEGATIVE
POC (COC) COCAINE: NEGATIVE
POC (MDMA) METHYLENEDIOXYMETHAMPHETAMINE 3,4: NEGATIVE
POC (MET) METHAMPHETAMINE: NEGATIVE
POC (MOP) OPIATES: ABNORMAL
POC (MTD) METHADONE: NEGATIVE
POC (OXY) OXYCODONE: NEGATIVE
POC (PCP) PHENCYCLIDINE: NEGATIVE
POC (TCA) TRICYCLIC ANTIDEPRESSANTS: NEGATIVE
POC TEMPERATURE (URINE): 92
POC THC: NEGATIVE

## 2023-02-06 PROCEDURE — 3079F PR MOST RECENT DIASTOLIC BLOOD PRESSURE 80-89 MM HG: ICD-10-PCS | Mod: CPTII,,, | Performed by: PHYSICIAN ASSISTANT

## 2023-02-06 PROCEDURE — 3008F BODY MASS INDEX DOCD: CPT | Mod: CPTII,,, | Performed by: PHYSICIAN ASSISTANT

## 2023-02-06 PROCEDURE — 99214 PR OFFICE/OUTPT VISIT, EST, LEVL IV, 30-39 MIN: ICD-10-PCS | Mod: S$PBB,,, | Performed by: PHYSICIAN ASSISTANT

## 2023-02-06 PROCEDURE — 3008F PR BODY MASS INDEX (BMI) DOCUMENTED: ICD-10-PCS | Mod: CPTII,,, | Performed by: PHYSICIAN ASSISTANT

## 2023-02-06 PROCEDURE — 99214 OFFICE O/P EST MOD 30 MIN: CPT | Mod: S$PBB,,, | Performed by: PHYSICIAN ASSISTANT

## 2023-02-06 PROCEDURE — 1159F PR MEDICATION LIST DOCUMENTED IN MEDICAL RECORD: ICD-10-PCS | Mod: CPTII,,, | Performed by: PHYSICIAN ASSISTANT

## 2023-02-06 PROCEDURE — 1159F MED LIST DOCD IN RCRD: CPT | Mod: CPTII,,, | Performed by: PHYSICIAN ASSISTANT

## 2023-02-06 PROCEDURE — 80305 DRUG TEST PRSMV DIR OPT OBS: CPT | Mod: PBBFAC | Performed by: PHYSICIAN ASSISTANT

## 2023-02-06 PROCEDURE — 3079F DIAST BP 80-89 MM HG: CPT | Mod: CPTII,,, | Performed by: PHYSICIAN ASSISTANT

## 2023-02-06 PROCEDURE — 3075F SYST BP GE 130 - 139MM HG: CPT | Mod: CPTII,,, | Performed by: PHYSICIAN ASSISTANT

## 2023-02-06 PROCEDURE — 99213 OFFICE O/P EST LOW 20 MIN: CPT | Mod: PBBFAC | Performed by: PHYSICIAN ASSISTANT

## 2023-02-06 PROCEDURE — 3075F PR MOST RECENT SYSTOLIC BLOOD PRESS GE 130-139MM HG: ICD-10-PCS | Mod: CPTII,,, | Performed by: PHYSICIAN ASSISTANT

## 2023-02-06 RX ORDER — HYDROCODONE BITARTRATE AND ACETAMINOPHEN 7.5; 325 MG/1; MG/1
1 TABLET ORAL EVERY 8 HOURS PRN
Qty: 90 TABLET | Refills: 0 | Status: SHIPPED | OUTPATIENT
Start: 2023-02-10 | End: 2023-04-06 | Stop reason: SDUPTHER

## 2023-02-06 RX ORDER — DICLOFENAC SODIUM 75 MG/1
75 TABLET, DELAYED RELEASE ORAL 2 TIMES DAILY
Qty: 60 TABLET | Refills: 2 | Status: SHIPPED | OUTPATIENT
Start: 2023-02-06 | End: 2023-05-04 | Stop reason: SDUPTHER

## 2023-02-06 RX ORDER — PREGABALIN 150 MG/1
150 CAPSULE ORAL EVERY 8 HOURS
Qty: 270 CAPSULE | Refills: 0 | Status: SHIPPED | OUTPATIENT
Start: 2023-02-06 | End: 2023-05-04 | Stop reason: SDUPTHER

## 2023-02-06 RX ORDER — AMITRIPTYLINE HYDROCHLORIDE 50 MG/1
50 TABLET, FILM COATED ORAL NIGHTLY
Qty: 90 TABLET | Refills: 0 | Status: SHIPPED | OUTPATIENT
Start: 2023-02-06 | End: 2023-05-04 | Stop reason: SDUPTHER

## 2023-02-06 RX ORDER — TIZANIDINE 4 MG/1
4 TABLET ORAL EVERY 8 HOURS
Qty: 270 TABLET | Refills: 0 | Status: SHIPPED | OUTPATIENT
Start: 2023-02-06 | End: 2023-05-04 | Stop reason: SDUPTHER

## 2023-02-06 RX ORDER — HYDROCODONE BITARTRATE AND ACETAMINOPHEN 7.5; 325 MG/1; MG/1
1 TABLET ORAL EVERY 8 HOURS PRN
Qty: 90 TABLET | Refills: 0 | Status: SHIPPED | OUTPATIENT
Start: 2023-03-12 | End: 2023-03-13 | Stop reason: SDUPTHER

## 2023-02-06 NOTE — PROGRESS NOTES
Subjective:         Patient ID: Monserrat Lee is a 37 y.o. female.    Chief Complaint: Low-back Pain and Hip Pain        Pain  This is a chronic problem. The current episode started more than 1 year ago. The problem occurs daily. The problem has been waxing and waning. Associated symptoms include arthralgias and neck pain. Pertinent negatives include no anorexia, chest pain, chills, coughing, diaphoresis, fever, sore throat, vertigo or vomiting.   Review of Systems   Constitutional:  Negative for activity change, appetite change, chills, diaphoresis, fever and unexpected weight change.   HENT:  Negative for drooling, ear discharge, ear pain, facial swelling, nosebleeds, sore throat, trouble swallowing, voice change and goiter.    Eyes:  Negative for photophobia, pain, discharge, redness and visual disturbance.   Respiratory:  Negative for apnea, cough, choking, chest tightness, shortness of breath, wheezing and stridor.    Cardiovascular:  Negative for chest pain, palpitations and leg swelling.   Gastrointestinal:  Negative for abdominal distention, anorexia, diarrhea, rectal pain, vomiting and fecal incontinence.   Endocrine: Negative for cold intolerance, heat intolerance, polydipsia, polyphagia and polyuria.   Genitourinary:  Negative for bladder incontinence, dysuria, flank pain, frequency and hot flashes.   Musculoskeletal:  Positive for arthralgias, back pain, leg pain and neck pain.   Integumentary:  Negative for color change and pallor.   Allergic/Immunologic: Negative for immunocompromised state.   Neurological:  Negative for dizziness, vertigo, seizures, syncope, facial asymmetry, speech difficulty, light-headedness, coordination difficulties, memory loss and coordination difficulties.   Hematological:  Negative for adenopathy. Does not bruise/bleed easily.   Psychiatric/Behavioral:  Negative for agitation, behavioral problems, confusion, decreased concentration, dysphoric mood, hallucinations,  "self-injury and suicidal ideas. The patient is not nervous/anxious and is not hyperactive.          Past Medical History:   Diagnosis Date    Anxiety disorder     Carpal tunnel syndrome     Cervical radiculopathy     Cervical spondylosis without myelopathy     Chronic pain syndrome     Depressive disorder     Herpes simplex     Insomnia     Low back pain     Lumbosacral radiculopathy     Sacroiliitis, not elsewhere classified      Past Surgical History:   Procedure Laterality Date    FOOT SURGERY Left     Cyst Removal Left Foot    TUBAL LIGATION  2008     Social History     Socioeconomic History    Marital status:    Tobacco Use    Smoking status: Every Day     Packs/day: 1.00     Types: Cigarettes    Smokeless tobacco: Never   Substance and Sexual Activity    Alcohol use: Not Currently    Drug use: Yes     Types: Hydrocodone     Family History   Problem Relation Age of Onset    Hypertension Father      Review of patient's allergies indicates:  No Known Allergies     Objective:  Vitals:    02/06/23 0849   BP: 133/83   Pulse: 85   Weight: 90.3 kg (199 lb)   Height: 5' 9" (1.753 m)   PainSc:   3         Physical Exam  Vitals and nursing note reviewed. Exam conducted with a chaperone present.   Constitutional:       General: She is awake. She is not in acute distress.     Appearance: Normal appearance. She is not ill-appearing or diaphoretic.   HENT:      Head: Normocephalic and atraumatic.      Nose: Nose normal.      Mouth/Throat:      Mouth: Mucous membranes are moist.      Pharynx: Oropharynx is clear.   Eyes:      Conjunctiva/sclera: Conjunctivae normal.      Pupils: Pupils are equal, round, and reactive to light.   Cardiovascular:      Rate and Rhythm: Normal rate.   Pulmonary:      Effort: Pulmonary effort is normal. No respiratory distress.   Abdominal:      Palpations: Abdomen is soft.   Musculoskeletal:         General: Normal range of motion.      Right shoulder: Tenderness present.      Left " shoulder: Tenderness present.      Right wrist: Tenderness present.      Left wrist: Tenderness present.      Cervical back: Normal range of motion and neck supple. Tenderness present.      Thoracic back: Tenderness present.      Lumbar back: Tenderness present.   Skin:     General: Skin is warm and dry.   Neurological:      General: No focal deficit present.      Mental Status: She is alert and oriented to person, place, and time. Mental status is at baseline.      Cranial Nerves: No cranial nerve deficit (II-XII).   Psychiatric:         Mood and Affect: Mood normal.         Behavior: Behavior normal. Behavior is cooperative.         Thought Content: Thought content normal.         No image results found.         Office Visit on 10/06/2022   Component Date Value Ref Range Status    POC Amphetamines 10/06/2022 Negative  Negative, Inconclusive Final    POC Barbiturates 10/06/2022 Negative  Negative, Inconclusive Final    POC Benzodiazepines 10/06/2022 Negative  Negative, Inconclusive Final    POC Cocaine 10/06/2022 Negative  Negative, Inconclusive Final    POC THC 10/06/2022 Negative  Negative, Inconclusive Final    POC Methadone 10/06/2022 Negative  Negative, Inconclusive Final    POC Methamphetamine 10/06/2022 Negative  Negative, Inconclusive Final    POC Opiates 10/06/2022 Presumptive Positive (A)  Negative, Inconclusive Final    POC Oxycodone 10/06/2022 Negative  Negative, Inconclusive Final    POC Phencyclidine 10/06/2022 Negative  Negative, Inconclusive Final    POC Methylenedioxymethamphetamine * 10/06/2022 Negative  Negative, Inconclusive Final    POC Tricyclic Antidepressants 10/06/2022 Negative  Negative, Inconclusive Final    POC Buprenorphine 10/06/2022 Negative   Final     Acceptable 10/06/2022 Yes   Final    POC Temperature (Urine) 10/06/2022 0   Final         Orders Placed This Encounter   Procedures    POCT Urine Drug Screen Presump     Interpretive Information:     Negative:  No drug  detected at the cut off level.   Positive:  This result represents presumptive positive for the   tested drug, other substances may yield a positive response other   than the analyte of interest. This result should be utilized for   diagnostic purpose only. Confirmation testing will be performed upon physician request only.            Requested Prescriptions     Pending Prescriptions Disp Refills    amitriptyline (ELAVIL) 50 MG tablet 90 tablet 0     Sig: Take 1 tablet (50 mg total) by mouth every evening.    diclofenac (VOLTAREN) 75 MG EC tablet 60 tablet 2     Sig: Take 1 tablet (75 mg total) by mouth 2 (two) times daily.    pregabalin (LYRICA) 150 MG capsule 270 capsule 0     Sig: Take 1 capsule (150 mg total) by mouth every 8 (eight) hours.    tiZANidine (ZANAFLEX) 4 MG tablet 270 tablet 0     Sig: Take 1 tablet (4 mg total) by mouth every 8 (eight) hours.    HYDROcodone-acetaminophen (NORCO) 7.5-325 mg per tablet 90 tablet 0     Sig: Take 1 tablet by mouth every 8 (eight) hours as needed for Pain.    HYDROcodone-acetaminophen (NORCO) 7.5-325 mg per tablet 90 tablet 0     Sig: Take 1 tablet by mouth every 8 (eight) hours as needed for Pain.       Assessment:     1. Lumbosacral spondylosis without myelopathy    2. Cervical spondylosis without myelopathy    3. Polyarthralgia    4. Encounter for long-term (current) use of other medications         A's of Opioid Risk Assessment  Activity:Patient can perform ADL.   Analgesia:Patients pain is partially controlled by current medication. Patient has tried OTC medications such as Tylenol and Ibuprofen with out relief.   Adverse Effects: Patient denies constipation or sedation.  Aberrant Behavior:  reviewed with no aberrant drug seeking/taking behavior.  Overdose reversal drug naloxone discussed    Drug screen reviewed      Plan:    Narcan December 2022    Following rheumatology    She would like to continue conservative management she states current medications  helping control her discomfort    Continue home exercise program as directed    Continue current medication    Follow-up 2 months     Dr. Velarde, May 2023    Bring original prescription medication bottles/container/box with labels to each visit    Pill count    Physical therapy    Massage therapy declines

## 2023-04-06 DIAGNOSIS — M47.812 CERVICAL SPONDYLOSIS WITHOUT MYELOPATHY: Chronic | ICD-10-CM

## 2023-04-06 DIAGNOSIS — M47.817 LUMBOSACRAL SPONDYLOSIS WITHOUT MYELOPATHY: Chronic | ICD-10-CM

## 2023-04-06 DIAGNOSIS — M25.50 POLYARTHRALGIA: Chronic | ICD-10-CM

## 2023-04-06 RX ORDER — HYDROCODONE BITARTRATE AND ACETAMINOPHEN 7.5; 325 MG/1; MG/1
1 TABLET ORAL EVERY 8 HOURS PRN
Qty: 90 TABLET | Refills: 0 | Status: SHIPPED | OUTPATIENT
Start: 2023-04-12 | End: 2023-05-04 | Stop reason: SDUPTHER

## 2023-04-06 NOTE — TELEPHONE ENCOUNTER
----- Message from Catrina Hardwick sent at 4/6/2023  9:24 AM CDT -----  Regarding: rx  Pt unable to make 4-11-23 appt pt would like to know if she could please have a refill on meds until she is able to come in please call pt back at [phone]  ANGELICA OVALLE   04/06/2023   10:04 AM ]  Spoke with patient, patient called in to work on 4/10-4/11 will be out of meds on 4/12. Will send in 1 month to [facility] pharmacy and will reschedule appt.   none

## 2023-05-04 ENCOUNTER — PATIENT MESSAGE (OUTPATIENT)
Dept: PAIN MEDICINE | Facility: CLINIC | Age: 38
End: 2023-05-04

## 2023-05-04 ENCOUNTER — OFFICE VISIT (OUTPATIENT)
Dept: PAIN MEDICINE | Facility: CLINIC | Age: 38
End: 2023-05-04
Payer: COMMERCIAL

## 2023-05-04 VITALS
DIASTOLIC BLOOD PRESSURE: 85 MMHG | SYSTOLIC BLOOD PRESSURE: 130 MMHG | HEIGHT: 68 IN | WEIGHT: 200 LBS | HEART RATE: 97 BPM | RESPIRATION RATE: 18 BRPM | BODY MASS INDEX: 30.31 KG/M2

## 2023-05-04 DIAGNOSIS — M47.812 CERVICAL SPONDYLOSIS WITHOUT MYELOPATHY: Chronic | ICD-10-CM

## 2023-05-04 DIAGNOSIS — Z79.899 ENCOUNTER FOR LONG-TERM (CURRENT) USE OF OTHER MEDICATIONS: ICD-10-CM

## 2023-05-04 DIAGNOSIS — M47.817 LUMBOSACRAL SPONDYLOSIS WITHOUT MYELOPATHY: Primary | Chronic | ICD-10-CM

## 2023-05-04 DIAGNOSIS — M25.50 POLYARTHRALGIA: Chronic | ICD-10-CM

## 2023-05-04 PROCEDURE — 99213 OFFICE O/P EST LOW 20 MIN: CPT | Mod: PBBFAC | Performed by: PHYSICIAN ASSISTANT

## 2023-05-04 PROCEDURE — 3008F PR BODY MASS INDEX (BMI) DOCUMENTED: ICD-10-PCS | Mod: CPTII,,, | Performed by: PHYSICIAN ASSISTANT

## 2023-05-04 PROCEDURE — 80305 DRUG TEST PRSMV DIR OPT OBS: CPT | Mod: PBBFAC | Performed by: PHYSICIAN ASSISTANT

## 2023-05-04 PROCEDURE — 1159F MED LIST DOCD IN RCRD: CPT | Mod: CPTII,,, | Performed by: PHYSICIAN ASSISTANT

## 2023-05-04 PROCEDURE — 3075F SYST BP GE 130 - 139MM HG: CPT | Mod: CPTII,,, | Performed by: PHYSICIAN ASSISTANT

## 2023-05-04 PROCEDURE — 3075F PR MOST RECENT SYSTOLIC BLOOD PRESS GE 130-139MM HG: ICD-10-PCS | Mod: CPTII,,, | Performed by: PHYSICIAN ASSISTANT

## 2023-05-04 PROCEDURE — 1159F PR MEDICATION LIST DOCUMENTED IN MEDICAL RECORD: ICD-10-PCS | Mod: CPTII,,, | Performed by: PHYSICIAN ASSISTANT

## 2023-05-04 PROCEDURE — 3008F BODY MASS INDEX DOCD: CPT | Mod: CPTII,,, | Performed by: PHYSICIAN ASSISTANT

## 2023-05-04 PROCEDURE — 3079F PR MOST RECENT DIASTOLIC BLOOD PRESSURE 80-89 MM HG: ICD-10-PCS | Mod: CPTII,,, | Performed by: PHYSICIAN ASSISTANT

## 2023-05-04 PROCEDURE — 99214 OFFICE O/P EST MOD 30 MIN: CPT | Mod: S$PBB,,, | Performed by: PHYSICIAN ASSISTANT

## 2023-05-04 PROCEDURE — 3079F DIAST BP 80-89 MM HG: CPT | Mod: CPTII,,, | Performed by: PHYSICIAN ASSISTANT

## 2023-05-04 PROCEDURE — 99214 PR OFFICE/OUTPT VISIT, EST, LEVL IV, 30-39 MIN: ICD-10-PCS | Mod: S$PBB,,, | Performed by: PHYSICIAN ASSISTANT

## 2023-05-04 RX ORDER — DICLOFENAC SODIUM 75 MG/1
75 TABLET, DELAYED RELEASE ORAL 2 TIMES DAILY
Qty: 60 TABLET | Refills: 2 | Status: SHIPPED | OUTPATIENT
Start: 2023-05-04 | End: 2023-07-06 | Stop reason: SDUPTHER

## 2023-05-04 RX ORDER — BUPROPION HYDROCHLORIDE 150 MG/1
150 TABLET ORAL
COMMUNITY
Start: 2023-04-14

## 2023-05-04 RX ORDER — PREGABALIN 150 MG/1
150 CAPSULE ORAL EVERY 8 HOURS
Qty: 270 CAPSULE | Refills: 0 | Status: SHIPPED | OUTPATIENT
Start: 2023-05-04 | End: 2023-07-06 | Stop reason: SDUPTHER

## 2023-05-04 RX ORDER — TIZANIDINE 4 MG/1
4 TABLET ORAL EVERY 8 HOURS
Qty: 270 TABLET | Refills: 0 | Status: SHIPPED | OUTPATIENT
Start: 2023-05-04 | End: 2023-07-06 | Stop reason: SDUPTHER

## 2023-05-04 RX ORDER — HYDROCODONE BITARTRATE AND ACETAMINOPHEN 7.5; 325 MG/1; MG/1
1 TABLET ORAL EVERY 8 HOURS PRN
Qty: 90 TABLET | Refills: 0 | Status: SHIPPED | OUTPATIENT
Start: 2023-06-11 | End: 2023-07-06 | Stop reason: SDUPTHER

## 2023-05-04 RX ORDER — HYDROCODONE BITARTRATE AND ACETAMINOPHEN 7.5; 325 MG/1; MG/1
1 TABLET ORAL EVERY 8 HOURS PRN
Qty: 90 TABLET | Refills: 0 | Status: SHIPPED | OUTPATIENT
Start: 2023-05-12 | End: 2023-07-06 | Stop reason: SDUPTHER

## 2023-05-04 RX ORDER — PANTOPRAZOLE SODIUM 40 MG/1
40 TABLET, DELAYED RELEASE ORAL
COMMUNITY
Start: 2023-04-18

## 2023-05-04 RX ORDER — ALPRAZOLAM 1 MG/1
TABLET ORAL
COMMUNITY
Start: 2023-03-22 | End: 2023-12-04

## 2023-05-04 RX ORDER — AMITRIPTYLINE HYDROCHLORIDE 50 MG/1
50 TABLET, FILM COATED ORAL NIGHTLY
Qty: 90 TABLET | Refills: 0 | Status: SHIPPED | OUTPATIENT
Start: 2023-05-04 | End: 2023-07-06 | Stop reason: SDUPTHER

## 2023-05-04 NOTE — PROGRESS NOTES
Subjective:         Patient ID: Monserrat Lee is a 37 y.o. female.    Chief Complaint: Low-back Pain and Hip Pain (Bilateral hips)        Pain  This is a chronic problem. The current episode started more than 1 year ago. The problem occurs daily. The problem has been unchanged. Associated symptoms include arthralgias and neck pain. Pertinent negatives include no anorexia, chest pain, chills, coughing, diaphoresis, fever, sore throat, vertigo or vomiting.   Review of Systems   Constitutional:  Negative for activity change, appetite change, chills, diaphoresis, fever and unexpected weight change.   HENT:  Negative for drooling, ear discharge, ear pain, facial swelling, nosebleeds, sore throat, trouble swallowing, voice change and goiter.    Eyes:  Negative for photophobia, pain, discharge, redness and visual disturbance.   Respiratory:  Negative for apnea, cough, choking, chest tightness, shortness of breath, wheezing and stridor.    Cardiovascular:  Negative for chest pain, palpitations and leg swelling.   Gastrointestinal:  Negative for abdominal distention, anorexia, diarrhea, rectal pain, vomiting and fecal incontinence.   Endocrine: Negative for cold intolerance, heat intolerance, polydipsia, polyphagia and polyuria.   Genitourinary:  Negative for bladder incontinence, dysuria, flank pain, frequency and hot flashes.   Musculoskeletal:  Positive for arthralgias, back pain, leg pain and neck pain.   Integumentary:  Negative for color change and pallor.   Allergic/Immunologic: Negative for immunocompromised state.   Neurological:  Negative for dizziness, vertigo, seizures, syncope, facial asymmetry, speech difficulty, light-headedness, coordination difficulties, memory loss and coordination difficulties.   Hematological:  Negative for adenopathy. Does not bruise/bleed easily.   Psychiatric/Behavioral:  Negative for agitation, behavioral problems, confusion, decreased concentration, dysphoric mood, hallucinations,  "self-injury and suicidal ideas. The patient is not nervous/anxious and is not hyperactive.          Past Medical History:   Diagnosis Date    Anxiety disorder     Carpal tunnel syndrome     Cervical radiculopathy     Cervical spondylosis without myelopathy     Chronic pain syndrome     Depressive disorder     Herpes simplex     Insomnia     Low back pain     Lumbosacral radiculopathy     Sacroiliitis, not elsewhere classified      Past Surgical History:   Procedure Laterality Date    FOOT SURGERY Left     Cyst Removal Left Foot    TUBAL LIGATION  2008     Social History     Socioeconomic History    Marital status:    Tobacco Use    Smoking status: Every Day     Packs/day: 1.00     Types: Cigarettes    Smokeless tobacco: Never   Substance and Sexual Activity    Alcohol use: Not Currently    Drug use: Yes     Types: Hydrocodone     Family History   Problem Relation Age of Onset    Hypertension Father      Review of patient's allergies indicates:  No Known Allergies     Objective:  Vitals:    05/04/23 1252   BP: 130/85   Pulse: 97   Resp: 18   Weight: 90.7 kg (200 lb)   Height: 5' 8" (1.727 m)   PainSc:   6           Physical Exam  Vitals and nursing note reviewed. Exam conducted with a chaperone present.   Constitutional:       General: She is awake. She is not in acute distress.     Appearance: Normal appearance. She is not ill-appearing or diaphoretic.   HENT:      Head: Normocephalic and atraumatic.      Nose: Nose normal.      Mouth/Throat:      Mouth: Mucous membranes are moist.      Pharynx: Oropharynx is clear.   Eyes:      Conjunctiva/sclera: Conjunctivae normal.      Pupils: Pupils are equal, round, and reactive to light.   Cardiovascular:      Rate and Rhythm: Normal rate.   Pulmonary:      Effort: Pulmonary effort is normal. No respiratory distress.   Abdominal:      Palpations: Abdomen is soft.   Musculoskeletal:         General: Normal range of motion.      Right shoulder: Tenderness present.      " Left shoulder: Tenderness present.      Right wrist: Tenderness present.      Left wrist: Tenderness present.      Cervical back: Normal range of motion and neck supple. Tenderness present.      Thoracic back: Tenderness present.      Lumbar back: Tenderness present.   Skin:     General: Skin is warm and dry.   Neurological:      General: No focal deficit present.      Mental Status: She is alert and oriented to person, place, and time. Mental status is at baseline.      Cranial Nerves: No cranial nerve deficit (II-XII).   Psychiatric:         Mood and Affect: Mood normal.         Behavior: Behavior normal. Behavior is cooperative.         Thought Content: Thought content normal.         No image results found.         Office Visit on 02/06/2023   Component Date Value Ref Range Status    POC Amphetamines 02/06/2023 Presumptive Positive (A)  Negative, Inconclusive Final    POC Barbiturates 02/06/2023 Negative  Negative, Inconclusive Final    POC Benzodiazepines 02/06/2023 Negative  Negative, Inconclusive Final    POC Cocaine 02/06/2023 Negative  Negative, Inconclusive Final    POC THC 02/06/2023 Negative  Negative, Inconclusive Final    POC Methadone 02/06/2023 Negative  Negative, Inconclusive Final    POC Methamphetamine 02/06/2023 Negative  Negative, Inconclusive Final    POC Opiates 02/06/2023 Presumptive Positive (A)  Negative, Inconclusive Final    POC Oxycodone 02/06/2023 Negative  Negative, Inconclusive Final    POC Phencyclidine 02/06/2023 Negative  Negative, Inconclusive Final    POC Methylenedioxymethamphetamine * 02/06/2023 Negative  Negative, Inconclusive Final    POC Tricyclic Antidepressants 02/06/2023 Negative  Negative, Inconclusive Final    POC Buprenorphine 02/06/2023 Negative   Final     Acceptable 02/06/2023 Yes   Final    POC Temperature (Urine) 02/06/2023 92   Final         Orders Placed This Encounter   Procedures    POCT Urine Drug Screen Presump     Interpretive Information:      Negative:  No drug detected at the cut off level.   Positive:  This result represents presumptive positive for the   tested drug, other substances may yield a positive response other   than the analyte of interest. This result should be utilized for   diagnostic purpose only. Confirmation testing will be performed upon physician request only.            Requested Prescriptions     Signed Prescriptions Disp Refills    amitriptyline (ELAVIL) 50 MG tablet 90 tablet 0     Sig: Take 1 tablet (50 mg total) by mouth every evening.    diclofenac (VOLTAREN) 75 MG EC tablet 60 tablet 2     Sig: Take 1 tablet (75 mg total) by mouth 2 (two) times daily.    pregabalin (LYRICA) 150 MG capsule 270 capsule 0     Sig: Take 1 capsule (150 mg total) by mouth every 8 (eight) hours.    tiZANidine (ZANAFLEX) 4 MG tablet 270 tablet 0     Sig: Take 1 tablet (4 mg total) by mouth every 8 (eight) hours.    HYDROcodone-acetaminophen (NORCO) 7.5-325 mg per tablet 90 tablet 0     Sig: Take 1 tablet by mouth every 8 (eight) hours as needed for Pain.    HYDROcodone-acetaminophen (NORCO) 7.5-325 mg per tablet 90 tablet 0     Sig: Take 1 tablet by mouth every 8 (eight) hours as needed for Pain.       Assessment:     1. Lumbosacral spondylosis without myelopathy    2. Cervical spondylosis without myelopathy    3. Polyarthralgia    4. Encounter for long-term (current) use of other medications         A's of Opioid Risk Assessment  Activity:Patient can perform ADL.   Analgesia:Patients pain is partially controlled by current medication. Patient has tried OTC medications such as Tylenol and Ibuprofen with out relief.   Adverse Effects: Patient denies constipation or sedation.  Aberrant Behavior:  reviewed with no aberrant drug seeking/taking behavior.  Overdose reversal drug naloxone discussed    Drug screen reviewed      Plan:    Narcan 2022    Following rheumatology    She states her insurance will  soon    She is concerned how  she will maintain her narcotics so she does not experience withdrawal     We discussed options if she can not afford to come to clinic will give patient a Catapres patch if she decides to discontinue her narcotics    Otherwise she states current medication does help with her discomfort she would like to continue with conservative management    Continue home exercise program as directed    Continue current medication    Follow-up 2 months     Dr. Velarde, May 2023    Bring original prescription medication bottles/container/box with labels to each visit

## 2023-05-12 ENCOUNTER — PATIENT MESSAGE (OUTPATIENT)
Dept: PAIN MEDICINE | Facility: CLINIC | Age: 38
End: 2023-05-12

## 2023-07-06 NOTE — PROGRESS NOTES
Subjective:         Patient ID: Monserrat Lee is a 37 y.o. female.    Chief Complaint: Low-back Pain and Neck Pain        Pain  This is a chronic problem. The current episode started more than 1 year ago. The problem occurs daily. The problem has been waxing and waning. Associated symptoms include arthralgias and neck pain. Pertinent negatives include no anorexia, chest pain, chills, coughing, diaphoresis, fever, sore throat, vertigo or vomiting.   Review of Systems   Constitutional:  Negative for activity change, appetite change, chills, diaphoresis, fever and unexpected weight change.   HENT:  Negative for drooling, ear discharge, ear pain, facial swelling, nosebleeds, sore throat, trouble swallowing, voice change and goiter.    Eyes:  Negative for photophobia, pain, discharge, redness and visual disturbance.   Respiratory:  Negative for apnea, cough, choking, chest tightness, shortness of breath, wheezing and stridor.    Cardiovascular:  Negative for chest pain, palpitations and leg swelling.   Gastrointestinal:  Negative for abdominal distention, anorexia, diarrhea, rectal pain, vomiting and fecal incontinence.   Endocrine: Negative for cold intolerance, heat intolerance, polydipsia, polyphagia and polyuria.   Genitourinary:  Negative for bladder incontinence, dysuria, flank pain, frequency and hot flashes.   Musculoskeletal:  Positive for arthralgias, back pain, leg pain and neck pain.   Integumentary:  Negative for color change and pallor.   Allergic/Immunologic: Negative for immunocompromised state.   Neurological:  Negative for dizziness, vertigo, seizures, syncope, facial asymmetry, speech difficulty, light-headedness, coordination difficulties, memory loss and coordination difficulties.   Hematological:  Negative for adenopathy. Does not bruise/bleed easily.   Psychiatric/Behavioral:  Negative for agitation, behavioral problems, confusion, decreased concentration, dysphoric mood, hallucinations,  "self-injury and suicidal ideas. The patient is not nervous/anxious and is not hyperactive.          Past Medical History:   Diagnosis Date    Anxiety disorder     Carpal tunnel syndrome     Cervical radiculopathy     Cervical spondylosis without myelopathy     Chronic pain syndrome     Depressive disorder     Herpes simplex     Insomnia     Low back pain     Lumbosacral radiculopathy     Sacroiliitis, not elsewhere classified      Past Surgical History:   Procedure Laterality Date    FOOT SURGERY Left     Cyst Removal Left Foot    TUBAL LIGATION  2008     Social History     Socioeconomic History    Marital status:    Tobacco Use    Smoking status: Every Day     Packs/day: 1.00     Types: Cigarettes    Smokeless tobacco: Never   Substance and Sexual Activity    Alcohol use: Not Currently    Drug use: Yes     Types: Hydrocodone     Family History   Problem Relation Age of Onset    Hypertension Father      Review of patient's allergies indicates:  No Known Allergies     Objective:  Vitals:    07/10/23 1247   BP: 137/64   Pulse: 87   Resp: 18   Weight: 88 kg (194 lb)   Height: 5' 9" (1.753 m)   PainSc:   2           Physical Exam  Vitals and nursing note reviewed. Exam conducted with a chaperone present.   Constitutional:       General: She is awake. She is not in acute distress.     Appearance: Normal appearance. She is not ill-appearing or diaphoretic.   HENT:      Head: Normocephalic and atraumatic.      Nose: Nose normal.      Mouth/Throat:      Mouth: Mucous membranes are moist.      Pharynx: Oropharynx is clear.   Eyes:      Conjunctiva/sclera: Conjunctivae normal.      Pupils: Pupils are equal, round, and reactive to light.   Cardiovascular:      Rate and Rhythm: Normal rate.   Pulmonary:      Effort: Pulmonary effort is normal. No respiratory distress.   Abdominal:      Palpations: Abdomen is soft.   Musculoskeletal:         General: Normal range of motion.      Right shoulder: Tenderness present.      " Left shoulder: Tenderness present.      Right wrist: Tenderness present.      Left wrist: Tenderness present.      Cervical back: Normal range of motion and neck supple. Tenderness present.      Thoracic back: Tenderness present.      Lumbar back: Tenderness present.   Skin:     General: Skin is warm and dry.   Neurological:      General: No focal deficit present.      Mental Status: She is alert and oriented to person, place, and time. Mental status is at baseline.      Cranial Nerves: No cranial nerve deficit (II-XII).   Psychiatric:         Mood and Affect: Mood normal.         Behavior: Behavior normal. Behavior is cooperative.         Thought Content: Thought content normal.         No image results found.         Office Visit on 05/04/2023   Component Date Value Ref Range Status    POC Amphetamines 05/04/2023 Negative  Negative, Inconclusive Final    POC Barbiturates 05/04/2023 Negative  Negative, Inconclusive Final    POC Benzodiazepines 05/04/2023 Negative  Negative, Inconclusive Final    POC Cocaine 05/04/2023 Negative  Negative, Inconclusive Final    POC THC 05/04/2023 Negative  Negative, Inconclusive Final    POC Methadone 05/04/2023 Negative  Negative, Inconclusive Final    POC Methamphetamine 05/04/2023 Negative  Negative, Inconclusive Final    POC Opiates 05/04/2023 Presumptive Positive (A)  Negative, Inconclusive Final    POC Oxycodone 05/04/2023 Negative  Negative, Inconclusive Final    POC Phencyclidine 05/04/2023 Negative  Negative, Inconclusive Final    POC Methylenedioxymethamphetamine * 05/04/2023 Negative  Negative, Inconclusive Final    POC Tricyclic Antidepressants 05/04/2023 Negative  Negative, Inconclusive Final    POC Buprenorphine 05/04/2023 Negative   Final     Acceptable 05/04/2023 Yes   Final    POC Temperature (Urine) 05/04/2023 92   Final   Office Visit on 02/06/2023   Component Date Value Ref Range Status    POC Amphetamines 02/06/2023 Presumptive Positive (A)   Negative, Inconclusive Final    POC Barbiturates 02/06/2023 Negative  Negative, Inconclusive Final    POC Benzodiazepines 02/06/2023 Negative  Negative, Inconclusive Final    POC Cocaine 02/06/2023 Negative  Negative, Inconclusive Final    POC THC 02/06/2023 Negative  Negative, Inconclusive Final    POC Methadone 02/06/2023 Negative  Negative, Inconclusive Final    POC Methamphetamine 02/06/2023 Negative  Negative, Inconclusive Final    POC Opiates 02/06/2023 Presumptive Positive (A)  Negative, Inconclusive Final    POC Oxycodone 02/06/2023 Negative  Negative, Inconclusive Final    POC Phencyclidine 02/06/2023 Negative  Negative, Inconclusive Final    POC Methylenedioxymethamphetamine * 02/06/2023 Negative  Negative, Inconclusive Final    POC Tricyclic Antidepressants 02/06/2023 Negative  Negative, Inconclusive Final    POC Buprenorphine 02/06/2023 Negative   Final     Acceptable 02/06/2023 Yes   Final    POC Temperature (Urine) 02/06/2023 92   Final         No orders of the defined types were placed in this encounter.        Requested Prescriptions     Signed Prescriptions Disp Refills    tiZANidine (ZANAFLEX) 4 MG tablet 270 tablet 0     Sig: Take 1 tablet (4 mg total) by mouth every 8 (eight) hours.    pregabalin (LYRICA) 150 MG capsule 270 capsule 0     Sig: Take 1 capsule (150 mg total) by mouth every 8 (eight) hours.    diclofenac (VOLTAREN) 75 MG EC tablet 60 tablet 2     Sig: Take 1 tablet (75 mg total) by mouth 2 (two) times daily.    amitriptyline (ELAVIL) 50 MG tablet 90 tablet 0     Sig: Take 1 tablet (50 mg total) by mouth every evening.    HYDROcodone-acetaminophen (NORCO) 7.5-325 mg per tablet 90 tablet 0     Sig: Take 1 tablet by mouth every 8 (eight) hours as needed for Pain.    HYDROcodone-acetaminophen (NORCO) 7.5-325 mg per tablet 90 tablet 0     Sig: Take 1 tablet by mouth every 8 (eight) hours as needed for Pain.       Assessment:     1. Lumbosacral spondylosis without myelopathy     2. Cervical spondylosis without myelopathy    3. Polyarthralgia         A's of Opioid Risk Assessment  Activity:Patient can perform ADL.   Analgesia:Patients pain is partially controlled by current medication. Patient has tried OTC medications such as Tylenol and Ibuprofen with out relief.   Adverse Effects: Patient denies constipation or sedation.  Aberrant Behavior:  reviewed with no aberrant drug seeking/taking behavior.  Overdose reversal drug naloxone discussed    Drug screen reviewed      Plan:    Prefers printed prescription    Narcan December 2022    Self-pay     Has applied for alexis program assistance with medical expenses    She states current medications helping with her discomfort     She would like to continue conservative management    Continue home exercise program as directed    Continue current medication    Follow-up 2 months     Dr. Velarde, May 2023    Bring original prescription medication bottles/container/box with labels to each visit

## 2023-07-07 ENCOUNTER — PATIENT MESSAGE (OUTPATIENT)
Dept: PAIN MEDICINE | Facility: CLINIC | Age: 38
End: 2023-07-07

## 2023-07-10 ENCOUNTER — OFFICE VISIT (OUTPATIENT)
Dept: PAIN MEDICINE | Facility: CLINIC | Age: 38
End: 2023-07-10

## 2023-07-10 VITALS
SYSTOLIC BLOOD PRESSURE: 137 MMHG | DIASTOLIC BLOOD PRESSURE: 64 MMHG | BODY MASS INDEX: 28.73 KG/M2 | RESPIRATION RATE: 18 BRPM | WEIGHT: 194 LBS | HEIGHT: 69 IN | HEART RATE: 87 BPM

## 2023-07-10 DIAGNOSIS — M25.50 POLYARTHRALGIA: Chronic | ICD-10-CM

## 2023-07-10 DIAGNOSIS — M47.817 LUMBOSACRAL SPONDYLOSIS WITHOUT MYELOPATHY: Primary | Chronic | ICD-10-CM

## 2023-07-10 DIAGNOSIS — M47.812 CERVICAL SPONDYLOSIS WITHOUT MYELOPATHY: Chronic | ICD-10-CM

## 2023-07-10 PROCEDURE — 99214 PR OFFICE/OUTPT VISIT, EST, LEVL IV, 30-39 MIN: ICD-10-PCS | Mod: S$PBB,,, | Performed by: PHYSICIAN ASSISTANT

## 2023-07-10 PROCEDURE — 99214 OFFICE O/P EST MOD 30 MIN: CPT | Mod: PBBFAC | Performed by: PHYSICIAN ASSISTANT

## 2023-07-10 PROCEDURE — 99214 OFFICE O/P EST MOD 30 MIN: CPT | Mod: S$PBB,,, | Performed by: PHYSICIAN ASSISTANT

## 2023-07-10 RX ORDER — DICLOFENAC SODIUM 75 MG/1
75 TABLET, DELAYED RELEASE ORAL 2 TIMES DAILY
Qty: 60 TABLET | Refills: 2 | Status: SHIPPED | OUTPATIENT
Start: 2023-07-10 | End: 2023-09-06 | Stop reason: SDUPTHER

## 2023-07-10 RX ORDER — TIZANIDINE 4 MG/1
4 TABLET ORAL EVERY 8 HOURS
Qty: 270 TABLET | Refills: 0 | Status: SHIPPED | OUTPATIENT
Start: 2023-07-10 | End: 2023-09-06 | Stop reason: SDUPTHER

## 2023-07-10 RX ORDER — HYDROCODONE BITARTRATE AND ACETAMINOPHEN 7.5; 325 MG/1; MG/1
1 TABLET ORAL EVERY 8 HOURS PRN
Qty: 90 TABLET | Refills: 0 | Status: SHIPPED | OUTPATIENT
Start: 2023-07-15 | End: 2023-10-16 | Stop reason: SDUPTHER

## 2023-07-10 RX ORDER — PREGABALIN 150 MG/1
150 CAPSULE ORAL EVERY 8 HOURS
Qty: 270 CAPSULE | Refills: 0 | Status: SHIPPED | OUTPATIENT
Start: 2023-07-10 | End: 2023-09-06 | Stop reason: SDUPTHER

## 2023-07-10 RX ORDER — AMITRIPTYLINE HYDROCHLORIDE 50 MG/1
50 TABLET, FILM COATED ORAL NIGHTLY
Qty: 90 TABLET | Refills: 0 | Status: SHIPPED | OUTPATIENT
Start: 2023-07-10 | End: 2023-09-06 | Stop reason: SDUPTHER

## 2023-07-10 RX ORDER — HYDROCODONE BITARTRATE AND ACETAMINOPHEN 7.5; 325 MG/1; MG/1
1 TABLET ORAL EVERY 8 HOURS PRN
Qty: 90 TABLET | Refills: 0 | Status: SHIPPED | OUTPATIENT
Start: 2023-08-14 | End: 2023-11-29 | Stop reason: SDUPTHER

## 2023-09-05 NOTE — PROGRESS NOTES
She Disclaimer: This note has been generated using voice-recognition software. There may be typographical errors that have been missed during proof-reading        Patient ID: Monserrat Lee is a 38 y.o. female.      Chief Complaint: Neck Pain      38-year-old female returns for re-evaluation of chronic cervical and lower back pain. She was diagnosed with porphyria but has been unable to seek rheumatological care due to lack of insurance.  Physical therapy failed to provide relief.  Her pain remains chronic and opioids are providing some relief.  She is unable to receive surgical evaluation or nerve block injections due to lack of insurance coverage.  She returns today for medication refill.  She denies any significant changes since last office visit.              Pain Assessment  Pain Assessment: 0-10  Pain Score:   7  Pain Location: Back  Pain Descriptors: Aching  Pain Frequency: Constant/continuous  Pain Onset: Awakened from sleep  Clinical Progression: Not changed  Aggravating Factors: Other (Comment), Standing (riding in car)  Pain Intervention(s): Medication (See eMAR), Home medication, Heat applied      A's of Opioid Risk Assessment  Activity:Patient can perform ADL.   Analgesia:Patients pain is  controlled by current medication.   Adverse Effects: Patient denies constipation or sedation.  Aberrant Behavior:  reviewed with no aberrant drug seeking/taking behavior.      Patient denies any suicidal or homicidal ideations    Physical Therapy/Home Exercise: yes      No image results found.      Review of Systems   Constitutional: Negative.    HENT: Negative.     Eyes: Negative.    Respiratory: Negative.     Cardiovascular: Negative.    Gastrointestinal: Negative.    Endocrine: Negative.    Genitourinary: Negative.    Musculoskeletal:  Positive for arthralgias, back pain and myalgias.   Integumentary:  Negative.   Neurological: Negative.    Hematological: Negative.    Psychiatric/Behavioral: Negative.                Past Medical History:   Diagnosis Date    Anxiety disorder     Carpal tunnel syndrome     Cervical radiculopathy     Cervical spondylosis without myelopathy     Chronic pain syndrome     Depressive disorder     Herpes simplex     Insomnia     Low back pain     Lumbosacral radiculopathy     Sacroiliitis, not elsewhere classified      Past Surgical History:   Procedure Laterality Date    FOOT SURGERY Left     Cyst Removal Left Foot    TUBAL LIGATION  2008     Social History     Socioeconomic History    Marital status:    Tobacco Use    Smoking status: Every Day     Current packs/day: 1.00     Types: Cigarettes    Smokeless tobacco: Never   Substance and Sexual Activity    Alcohol use: Not Currently    Drug use: Yes     Types: Hydrocodone     Family History   Problem Relation Age of Onset    Hypertension Father      Review of patient's allergies indicates:  No Known Allergies  has a current medication list which includes the following prescription(s): alprazolam, bupropion, cetirizine, fluoxetine, hydrocodone-acetaminophen, hydrocodone-acetaminophen, lisdexamfetamine, pantoprazole, amitriptyline, diclofenac, [START ON 9/14/2023] hydrocodone-acetaminophen, [START ON 10/14/2023] hydrocodone-acetaminophen, [START ON 11/13/2023] hydrocodone-acetaminophen, pregabalin, and tizanidine.      Objective:  Vitals:    09/06/23 1114   BP: 132/87   Pulse: 85   Resp: 18        Physical Exam  Vitals and nursing note reviewed.   Constitutional:       General: She is not in acute distress.     Appearance: Normal appearance. She is not ill-appearing, toxic-appearing or diaphoretic.   HENT:      Head: Normocephalic and atraumatic.      Nose: Nose normal.      Mouth/Throat:      Mouth: Mucous membranes are moist.   Eyes:      Extraocular Movements: Extraocular movements intact.      Pupils: Pupils are equal, round, and reactive to light.   Cardiovascular:      Rate and Rhythm: Normal rate and regular rhythm.      Heart sounds:  Normal heart sounds.   Pulmonary:      Effort: Pulmonary effort is normal. No respiratory distress.      Breath sounds: Normal breath sounds. No stridor. No wheezing or rhonchi.   Abdominal:      General: Bowel sounds are normal.      Palpations: Abdomen is soft.   Musculoskeletal:         General: No swelling or deformity. Normal range of motion.      Cervical back: Normal and normal range of motion. No spasms or tenderness. No pain with movement. Normal range of motion.      Thoracic back: Normal.      Lumbar back: Tenderness and bony tenderness present. No spasms. Normal range of motion. Negative right straight leg raise test and negative left straight leg raise test. No scoliosis.      Right lower leg: No edema.      Left lower leg: No edema.   Skin:     General: Skin is warm.   Neurological:      General: No focal deficit present.      Mental Status: She is alert and oriented to person, place, and time. Mental status is at baseline.      Cranial Nerves: No cranial nerve deficit.      Sensory: Sensation is intact. No sensory deficit.      Motor: No weakness.      Coordination: Coordination normal.      Gait: Gait normal.      Deep Tendon Reflexes: Reflexes are normal and symmetric.   Psychiatric:         Mood and Affect: Mood normal.         Behavior: Behavior normal.           Assessment:      1. Encounter for long-term (current) use of other medications    2. Cervical spondylosis without myelopathy    3. Lumbosacral spondylosis without myelopathy    4. Polyarthralgia          Plan:  1. reviewed  2.Addiction, Dependency, Tolerance, Opioid abuse-misuse, Death, Diversion Discussed. Overdose reversal drug Naloxone discussed.  3.Refill/Continue medications for pain control and function       Requested Prescriptions     Signed Prescriptions Disp Refills    HYDROcodone-acetaminophen (NORCO) 7.5-325 mg per tablet 90 tablet 0     Sig: Take 1 tablet by mouth every 8 (eight) hours as needed for Pain.     HYDROcodone-acetaminophen (NORCO) 7.5-325 mg per tablet 90 tablet 0     Sig: Take 1 tablet by mouth every 8 (eight) hours as needed for Pain.    tiZANidine (ZANAFLEX) 4 MG tablet 270 tablet 1     Sig: Take 1 tablet (4 mg total) by mouth every 8 (eight) hours.    pregabalin (LYRICA) 150 MG capsule 270 capsule 1     Sig: Take 1 capsule (150 mg total) by mouth every 8 (eight) hours.    amitriptyline (ELAVIL) 50 MG tablet 90 tablet 1     Sig: Take 1 tablet (50 mg total) by mouth every evening.    diclofenac (VOLTAREN) 75 MG EC tablet 60 tablet 5     Sig: Take 1 tablet (75 mg total) by mouth 2 (two) times daily.    HYDROcodone-acetaminophen (NORCO) 7.5-325 mg per tablet 90 tablet 0     Sig: Take 1 tablet by mouth every 8 (eight) hours as needed for Pain.     4.Patient is unable to afford nerve block injections, physical therapy or surgical consultation due to lack of insurance     5.Follow with BETITO Gentile in 3 months for re-evaluation and medication refill         report:  Reviewed and consistent with medication use as prescribed.      The total time spent for evaluation and management on 09/07/2023 including reviewing separately obtained history, performing a medically appropriate exam and evaluation, documenting clinical information in the health record, independently interpreting results and communicating them to the patient/family/caregiver, and ordering medications/tests/procedures was between 15-29 minutes.    The above plan and management options were discussed at length with patient. Patient is in agreement with the above and verbalized understanding. It will be communicated with the referring physician via electronic record, fax, or mail.

## 2023-09-06 ENCOUNTER — OFFICE VISIT (OUTPATIENT)
Dept: PAIN MEDICINE | Facility: CLINIC | Age: 38
End: 2023-09-06

## 2023-09-06 VITALS
HEART RATE: 85 BPM | WEIGHT: 194 LBS | BODY MASS INDEX: 28.73 KG/M2 | HEIGHT: 69 IN | SYSTOLIC BLOOD PRESSURE: 132 MMHG | RESPIRATION RATE: 18 BRPM | DIASTOLIC BLOOD PRESSURE: 87 MMHG

## 2023-09-06 DIAGNOSIS — M25.50 POLYARTHRALGIA: Chronic | ICD-10-CM

## 2023-09-06 DIAGNOSIS — Z79.899 ENCOUNTER FOR LONG-TERM (CURRENT) USE OF OTHER MEDICATIONS: Primary | ICD-10-CM

## 2023-09-06 DIAGNOSIS — M47.812 CERVICAL SPONDYLOSIS WITHOUT MYELOPATHY: Chronic | ICD-10-CM

## 2023-09-06 DIAGNOSIS — M47.817 LUMBOSACRAL SPONDYLOSIS WITHOUT MYELOPATHY: Chronic | ICD-10-CM

## 2023-09-06 LAB
CTP QC/QA: YES
POC (AMP) AMPHETAMINE: ABNORMAL
POC (BAR) BARBITURATES: NEGATIVE
POC (BUP) BUPRENORPHINE: NEGATIVE
POC (BZO) BENZODIAZEPINES: NEGATIVE
POC (COC) COCAINE: NEGATIVE
POC (MDMA) METHYLENEDIOXYMETHAMPHETAMINE 3,4: NEGATIVE
POC (MET) METHAMPHETAMINE: NEGATIVE
POC (MOP) OPIATES: ABNORMAL
POC (MTD) METHADONE: NEGATIVE
POC (OXY) OXYCODONE: NEGATIVE
POC (PCP) PHENCYCLIDINE: NEGATIVE
POC (TCA) TRICYCLIC ANTIDEPRESSANTS: NEGATIVE
POC TEMPERATURE (URINE): 90
POC THC: NEGATIVE

## 2023-09-06 PROCEDURE — 99214 OFFICE O/P EST MOD 30 MIN: CPT | Mod: PBBFAC | Performed by: PAIN MEDICINE

## 2023-09-06 PROCEDURE — 99999PBSHW POCT URINE DRUG SCREEN PRESUMP: Mod: PBBFAC,,,

## 2023-09-06 PROCEDURE — 99214 PR OFFICE/OUTPT VISIT, EST, LEVL IV, 30-39 MIN: ICD-10-PCS | Mod: S$PBB,,, | Performed by: PAIN MEDICINE

## 2023-09-06 PROCEDURE — 80305 DRUG TEST PRSMV DIR OPT OBS: CPT | Mod: PBBFAC | Performed by: PAIN MEDICINE

## 2023-09-06 PROCEDURE — 99999PBSHW POCT URINE DRUG SCREEN PRESUMP: ICD-10-PCS | Mod: PBBFAC,,,

## 2023-09-06 PROCEDURE — 99214 OFFICE O/P EST MOD 30 MIN: CPT | Mod: S$PBB,,, | Performed by: PAIN MEDICINE

## 2023-09-06 RX ORDER — HYDROCODONE BITARTRATE AND ACETAMINOPHEN 7.5; 325 MG/1; MG/1
1 TABLET ORAL EVERY 8 HOURS PRN
Qty: 90 TABLET | Refills: 0 | Status: SHIPPED | OUTPATIENT
Start: 2023-10-14 | End: 2023-11-13

## 2023-09-06 RX ORDER — PREGABALIN 150 MG/1
150 CAPSULE ORAL EVERY 8 HOURS
Qty: 270 CAPSULE | Refills: 1 | Status: SHIPPED | OUTPATIENT
Start: 2023-09-06 | End: 2024-01-09 | Stop reason: SDUPTHER

## 2023-09-06 RX ORDER — TIZANIDINE 4 MG/1
4 TABLET ORAL EVERY 8 HOURS
Qty: 270 TABLET | Refills: 1 | Status: SHIPPED | OUTPATIENT
Start: 2023-09-06 | End: 2024-02-26 | Stop reason: SDUPTHER

## 2023-09-06 RX ORDER — DICLOFENAC SODIUM 75 MG/1
75 TABLET, DELAYED RELEASE ORAL 2 TIMES DAILY
Qty: 60 TABLET | Refills: 5 | Status: SHIPPED | OUTPATIENT
Start: 2023-09-06 | End: 2023-11-01 | Stop reason: SDUPTHER

## 2023-09-06 RX ORDER — AMITRIPTYLINE HYDROCHLORIDE 50 MG/1
50 TABLET, FILM COATED ORAL NIGHTLY
Qty: 90 TABLET | Refills: 1 | Status: SHIPPED | OUTPATIENT
Start: 2023-09-06 | End: 2023-12-04 | Stop reason: SDUPTHER

## 2023-09-06 RX ORDER — HYDROCODONE BITARTRATE AND ACETAMINOPHEN 10; 325 MG/1; MG/1
1 TABLET ORAL EVERY 8 HOURS PRN
Qty: 90 TABLET | Refills: 0 | Status: CANCELLED | OUTPATIENT
Start: 2023-11-13 | End: 2023-12-13

## 2023-09-06 RX ORDER — HYDROCODONE BITARTRATE AND ACETAMINOPHEN 7.5; 325 MG/1; MG/1
1 TABLET ORAL EVERY 8 HOURS PRN
Qty: 90 TABLET | Refills: 0 | Status: SHIPPED | OUTPATIENT
Start: 2023-11-13 | End: 2023-12-13

## 2023-09-06 RX ORDER — HYDROCODONE BITARTRATE AND ACETAMINOPHEN 7.5; 325 MG/1; MG/1
1 TABLET ORAL EVERY 8 HOURS PRN
Qty: 90 TABLET | Refills: 0 | Status: SHIPPED | OUTPATIENT
Start: 2023-09-14 | End: 2023-10-14

## 2023-10-16 ENCOUNTER — PATIENT MESSAGE (OUTPATIENT)
Dept: PAIN MEDICINE | Facility: CLINIC | Age: 38
End: 2023-10-16

## 2023-10-16 DIAGNOSIS — M25.50 POLYARTHRALGIA: Chronic | ICD-10-CM

## 2023-10-16 DIAGNOSIS — M47.812 CERVICAL SPONDYLOSIS WITHOUT MYELOPATHY: Chronic | ICD-10-CM

## 2023-10-16 DIAGNOSIS — M47.817 LUMBOSACRAL SPONDYLOSIS WITHOUT MYELOPATHY: Chronic | ICD-10-CM

## 2023-10-16 RX ORDER — HYDROCODONE BITARTRATE AND ACETAMINOPHEN 7.5; 325 MG/1; MG/1
1 TABLET ORAL EVERY 8 HOURS PRN
Qty: 90 TABLET | Refills: 0 | Status: SHIPPED | OUTPATIENT
Start: 2023-10-16 | End: 2024-02-26

## 2023-10-16 RX ORDER — HYDROCODONE BITARTRATE AND ACETAMINOPHEN 7.5; 325 MG/1; MG/1
1 TABLET ORAL EVERY 8 HOURS PRN
Qty: 90 TABLET | Refills: 0 | Status: SHIPPED | OUTPATIENT
Start: 2023-10-16 | End: 2023-10-16 | Stop reason: RX

## 2023-10-17 ENCOUNTER — TELEPHONE (OUTPATIENT)
Dept: PAIN MEDICINE | Facility: CLINIC | Age: 38
End: 2023-10-17

## 2023-10-17 NOTE — TELEPHONE ENCOUNTER
----- Message from Bailee Adams sent at 10/16/2023 11:17 AM CDT -----  [phone] PATIENT NEEDS HER NORCO SENT TO [facility] IN [place]     Medications has been sent to pharmacy requested. Voicemail is left at contact number given by patient.

## 2023-10-17 NOTE — TELEPHONE ENCOUNTER
----- Message from Ariadne Velarde MD sent at 10/16/2023  4:28 PM CDT -----  Medications sent to [facility]  ----- Message -----  From: Elliott Pardo LPN  Sent: 10/16/2023  11:22 AM CDT  To: Ariadne Velarde MD      ----- Message -----  From: Bailee Adams  Sent: 10/16/2023  11:18 AM CDT  To: Syd Ron Staff    [phone] PATIENT NEEDS HER NORCO SENT TO [facility] IN [place]     I have attempted to reach patient at given contact number with no answer. Voicemail left instructing that her medication has been sent to pharmacy requested.

## 2023-10-31 ENCOUNTER — PATIENT MESSAGE (OUTPATIENT)
Dept: PAIN MEDICINE | Facility: CLINIC | Age: 38
End: 2023-10-31

## 2023-10-31 DIAGNOSIS — M25.50 POLYARTHRALGIA: Chronic | ICD-10-CM

## 2023-10-31 DIAGNOSIS — M47.812 CERVICAL SPONDYLOSIS WITHOUT MYELOPATHY: Chronic | ICD-10-CM

## 2023-10-31 DIAGNOSIS — M47.817 LUMBOSACRAL SPONDYLOSIS WITHOUT MYELOPATHY: Chronic | ICD-10-CM

## 2023-11-01 RX ORDER — DICLOFENAC SODIUM 75 MG/1
75 TABLET, DELAYED RELEASE ORAL 2 TIMES DAILY
Qty: 60 TABLET | Refills: 5 | Status: SHIPPED | OUTPATIENT
Start: 2023-11-01 | End: 2023-11-01 | Stop reason: SDUPTHER

## 2023-11-01 RX ORDER — DICLOFENAC SODIUM 75 MG/1
75 TABLET, DELAYED RELEASE ORAL 2 TIMES DAILY
Qty: 60 TABLET | Refills: 5 | Status: SHIPPED | OUTPATIENT
Start: 2023-11-01

## 2023-11-29 NOTE — PROGRESS NOTES
Subjective:         Patient ID: Monserrat Lee is a 38 y.o. female.    Chief Complaint: Low-back Pain and Hip Pain (right)        Pain  This is a chronic problem. The current episode started more than 1 year ago. The problem occurs daily. The problem has been waxing and waning. Associated symptoms include arthralgias and neck pain. Pertinent negatives include no anorexia, chest pain, chills, coughing, diaphoresis, fever, sore throat, vertigo or vomiting.     Review of Systems   Constitutional:  Negative for activity change, appetite change, chills, diaphoresis, fever and unexpected weight change.   HENT:  Negative for drooling, ear discharge, ear pain, facial swelling, nosebleeds, sore throat, trouble swallowing, voice change and goiter.    Eyes:  Negative for photophobia, pain, discharge, redness and visual disturbance.   Respiratory:  Negative for apnea, cough, choking, chest tightness, shortness of breath, wheezing and stridor.    Cardiovascular:  Negative for chest pain, palpitations and leg swelling.   Gastrointestinal:  Negative for abdominal distention, anorexia, diarrhea, rectal pain, vomiting and fecal incontinence.   Endocrine: Negative for cold intolerance, heat intolerance, polydipsia, polyphagia and polyuria.   Genitourinary:  Negative for bladder incontinence, dysuria, flank pain, frequency and hot flashes.   Musculoskeletal:  Positive for arthralgias, back pain, leg pain and neck pain.   Integumentary:  Negative for color change and pallor.   Allergic/Immunologic: Negative for immunocompromised state.   Neurological:  Negative for dizziness, vertigo, seizures, syncope, facial asymmetry, speech difficulty, light-headedness, memory loss and coordination difficulties.   Hematological:  Negative for adenopathy. Does not bruise/bleed easily.   Psychiatric/Behavioral:  Negative for agitation, behavioral problems, confusion, decreased concentration, dysphoric mood, hallucinations, self-injury and suicidal  "ideas. The patient is not nervous/anxious and is not hyperactive.            Past Medical History:   Diagnosis Date    Anxiety disorder     Carpal tunnel syndrome     Cervical radiculopathy     Cervical spondylosis without myelopathy     Chronic pain syndrome     Depressive disorder     Herpes simplex     Insomnia     Low back pain     Lumbosacral radiculopathy     Sacroiliitis, not elsewhere classified      Past Surgical History:   Procedure Laterality Date    FOOT SURGERY Left     Cyst Removal Left Foot    TUBAL LIGATION  2008     Social History     Socioeconomic History    Marital status:    Tobacco Use    Smoking status: Every Day     Current packs/day: 1.00     Types: Cigarettes    Smokeless tobacco: Never   Substance and Sexual Activity    Alcohol use: Not Currently    Drug use: Yes     Types: Hydrocodone     Family History   Problem Relation Age of Onset    Hypertension Father      Review of patient's allergies indicates:  No Known Allergies     Objective:  Vitals:    12/04/23 1053 12/04/23 1054   BP: 126/70    Pulse: 89    Resp: 20    Weight: 86.6 kg (191 lb)    Height: 5' 9" (1.753 m)    PainSc:   7   7           Physical Exam  Vitals and nursing note reviewed. Exam conducted with a chaperone present.   Constitutional:       General: She is awake. She is not in acute distress.     Appearance: Normal appearance. She is not ill-appearing or diaphoretic.   HENT:      Head: Normocephalic and atraumatic.      Nose: Nose normal.      Mouth/Throat:      Mouth: Mucous membranes are moist.      Pharynx: Oropharynx is clear.   Eyes:      Conjunctiva/sclera: Conjunctivae normal.      Pupils: Pupils are equal, round, and reactive to light.   Cardiovascular:      Rate and Rhythm: Normal rate.   Pulmonary:      Effort: Pulmonary effort is normal. No respiratory distress.   Abdominal:      Palpations: Abdomen is soft.   Musculoskeletal:         General: Normal range of motion.      Right shoulder: Tenderness " present.      Left shoulder: Tenderness present.      Right wrist: Tenderness present.      Left wrist: Tenderness present.      Cervical back: Normal range of motion and neck supple. Tenderness present.      Thoracic back: Tenderness present.      Lumbar back: Tenderness present.   Skin:     General: Skin is warm and dry.   Neurological:      General: No focal deficit present.      Mental Status: She is alert and oriented to person, place, and time. Mental status is at baseline.      Cranial Nerves: No cranial nerve deficit (II-XII).   Psychiatric:         Mood and Affect: Mood normal.         Behavior: Behavior normal. Behavior is cooperative.         Thought Content: Thought content normal.           No image results found.         Office Visit on 09/06/2023   Component Date Value Ref Range Status    POC Amphetamines 09/06/2023 Presumptive Positive (A)  Negative, Inconclusive Final    POC Barbiturates 09/06/2023 Negative  Negative, Inconclusive Final    POC Benzodiazepines 09/06/2023 Negative  Negative, Inconclusive Final    POC Cocaine 09/06/2023 Negative  Negative, Inconclusive Final    POC THC 09/06/2023 Negative  Negative, Inconclusive Final    POC Methadone 09/06/2023 Negative  Negative, Inconclusive Final    POC Methamphetamine 09/06/2023 Negative  Negative, Inconclusive Final    POC Opiates 09/06/2023 Presumptive Positive (A)  Negative, Inconclusive Final    POC Oxycodone 09/06/2023 Negative  Negative, Inconclusive Final    POC Phencyclidine 09/06/2023 Negative  Negative, Inconclusive Final    POC Methylenedioxymethamphetamine * 09/06/2023 Negative  Negative, Inconclusive Final    POC Tricyclic Antidepressants 09/06/2023 Negative  Negative, Inconclusive Final    POC Buprenorphine 09/06/2023 Negative   Final     Acceptable 09/06/2023 Yes   Final    POC Temperature (Urine) 09/06/2023 90   Final         Orders Placed This Encounter   Procedures    POCT Urine Drug Screen Presump     Interpretive  Information:     Negative:  No drug detected at the cut off level.   Positive:  This result represents presumptive positive for the   tested drug, other substances may yield a positive response other   than the analyte of interest. This result should be utilized for   diagnostic purpose only. Confirmation testing will be performed upon physician request only.            Requested Prescriptions     Signed Prescriptions Disp Refills    HYDROcodone-acetaminophen (NORCO) 7.5-325 mg per tablet 90 tablet 0     Sig: Take 1 tablet by mouth every 8 (eight) hours as needed for Pain.    HYDROcodone-acetaminophen (NORCO) 7.5-325 mg per tablet 90 tablet 0     Sig: Take 1 tablet by mouth every 8 (eight) hours as needed for Pain.    HYDROcodone-acetaminophen (NORCO) 7.5-325 mg per tablet 90 tablet 0     Sig: Take 1 tablet by mouth every 8 (eight) hours as needed for Pain.    amitriptyline (ELAVIL) 50 MG tablet 90 tablet 2     Sig: Take 1 tablet (50 mg total) by mouth every evening.    diclofenac sodium (VOLTAREN ARTHRITIS PAIN) 1 % Gel 10 each 2     Sig: Apply 2 g topically 2 (two) times daily. Apply 2 grams BID to knees, elbows, hips joints bilaterally as needed       Assessment:     1. Polyarthralgia    2. Cervical spondylosis without myelopathy    3. Lumbosacral spondylosis without myelopathy    4. Encounter for long-term (current) use of other medications         A's of Opioid Risk Assessment  Activity:Patient can perform ADL.   Analgesia:Patients pain is partially controlled by current medication. Patient has tried OTC medications such as Tylenol and Ibuprofen with out relief.   Adverse Effects: Patient denies constipation or sedation.  Aberrant Behavior:  reviewed with no aberrant drug seeking/taking behavior.  Overdose reversal drug naloxone discussed    Drug screen reviewed      Plan:    Prefers printed prescription    Narcan December 2022    Self-pay     Complaint of back and joint pain requesting Toradol  injection    Toradol 60 mg IM, tolerated well     Otherwise she states current medication does help with her discomfort    She would like to continue conservative management    Continue home exercise program as directed    Continue current medication    Follow-up 3 months     Dr. Velarde, September 2024    Bring original prescription medication bottles/container/box with labels to each visit

## 2023-12-04 ENCOUNTER — OFFICE VISIT (OUTPATIENT)
Dept: PAIN MEDICINE | Facility: CLINIC | Age: 38
End: 2023-12-04

## 2023-12-04 VITALS
HEIGHT: 69 IN | DIASTOLIC BLOOD PRESSURE: 70 MMHG | WEIGHT: 191 LBS | RESPIRATION RATE: 20 BRPM | SYSTOLIC BLOOD PRESSURE: 126 MMHG | HEART RATE: 89 BPM | BODY MASS INDEX: 28.29 KG/M2

## 2023-12-04 DIAGNOSIS — M47.817 LUMBOSACRAL SPONDYLOSIS WITHOUT MYELOPATHY: Chronic | ICD-10-CM

## 2023-12-04 DIAGNOSIS — M47.812 CERVICAL SPONDYLOSIS WITHOUT MYELOPATHY: Chronic | ICD-10-CM

## 2023-12-04 DIAGNOSIS — Z79.899 ENCOUNTER FOR LONG-TERM (CURRENT) USE OF OTHER MEDICATIONS: ICD-10-CM

## 2023-12-04 DIAGNOSIS — M25.50 POLYARTHRALGIA: Primary | Chronic | ICD-10-CM

## 2023-12-04 PROCEDURE — 99999PBSHW PR PBB SHADOW TECHNICAL ONLY FILED TO HB: ICD-10-PCS | Mod: PBBFAC,,,

## 2023-12-04 PROCEDURE — 99999PBSHW POCT URINE DRUG SCREEN PRESUMP: Mod: PBBFAC,,,

## 2023-12-04 PROCEDURE — 99999PBSHW PR PBB SHADOW TECHNICAL ONLY FILED TO HB: Mod: PBBFAC,,,

## 2023-12-04 PROCEDURE — 80305 DRUG TEST PRSMV DIR OPT OBS: CPT | Mod: PBBFAC | Performed by: PHYSICIAN ASSISTANT

## 2023-12-04 PROCEDURE — 96372 THER/PROPH/DIAG INJ SC/IM: CPT | Mod: PBBFAC | Performed by: PHYSICIAN ASSISTANT

## 2023-12-04 PROCEDURE — 99214 OFFICE O/P EST MOD 30 MIN: CPT | Mod: S$PBB,25,, | Performed by: PHYSICIAN ASSISTANT

## 2023-12-04 PROCEDURE — 99214 PR OFFICE/OUTPT VISIT, EST, LEVL IV, 30-39 MIN: ICD-10-PCS | Mod: S$PBB,25,, | Performed by: PHYSICIAN ASSISTANT

## 2023-12-04 PROCEDURE — 99214 OFFICE O/P EST MOD 30 MIN: CPT | Mod: PBBFAC | Performed by: PHYSICIAN ASSISTANT

## 2023-12-04 RX ORDER — HYDROCODONE BITARTRATE AND ACETAMINOPHEN 7.5; 325 MG/1; MG/1
1 TABLET ORAL EVERY 8 HOURS PRN
Qty: 90 TABLET | Refills: 0 | Status: SHIPPED | OUTPATIENT
Start: 2024-02-14 | End: 2024-02-26

## 2023-12-04 RX ORDER — DICLOFENAC SODIUM 10 MG/G
2 GEL TOPICAL 2 TIMES DAILY
Qty: 10 EACH | Refills: 2 | Status: SHIPPED | OUTPATIENT
Start: 2023-12-04

## 2023-12-04 RX ORDER — KETOROLAC TROMETHAMINE 30 MG/ML
60 INJECTION, SOLUTION INTRAMUSCULAR; INTRAVENOUS
Status: COMPLETED | OUTPATIENT
Start: 2023-12-04 | End: 2023-12-04

## 2023-12-04 RX ORDER — AMITRIPTYLINE HYDROCHLORIDE 50 MG/1
50 TABLET, FILM COATED ORAL NIGHTLY
Qty: 90 TABLET | Refills: 2 | Status: SHIPPED | OUTPATIENT
Start: 2023-12-04 | End: 2024-02-26 | Stop reason: SDUPTHER

## 2023-12-04 RX ORDER — HYDROCODONE BITARTRATE AND ACETAMINOPHEN 7.5; 325 MG/1; MG/1
1 TABLET ORAL EVERY 8 HOURS PRN
Qty: 90 TABLET | Refills: 0 | Status: SHIPPED | OUTPATIENT
Start: 2023-12-16 | End: 2024-02-26

## 2023-12-04 RX ORDER — HYDROCODONE BITARTRATE AND ACETAMINOPHEN 7.5; 325 MG/1; MG/1
1 TABLET ORAL EVERY 8 HOURS PRN
Qty: 90 TABLET | Refills: 0 | Status: SHIPPED | OUTPATIENT
Start: 2024-01-15 | End: 2024-02-26

## 2023-12-04 RX ADMIN — KETOROLAC TROMETHAMINE 60 MG: 30 INJECTION, SOLUTION INTRAMUSCULAR at 11:12

## 2024-01-09 ENCOUNTER — PATIENT MESSAGE (OUTPATIENT)
Dept: PAIN MEDICINE | Facility: CLINIC | Age: 39
End: 2024-01-09

## 2024-01-09 DIAGNOSIS — M47.817 LUMBOSACRAL SPONDYLOSIS WITHOUT MYELOPATHY: Chronic | ICD-10-CM

## 2024-01-09 DIAGNOSIS — M25.50 POLYARTHRALGIA: Chronic | ICD-10-CM

## 2024-01-09 DIAGNOSIS — M47.812 CERVICAL SPONDYLOSIS WITHOUT MYELOPATHY: Chronic | ICD-10-CM

## 2024-01-09 RX ORDER — PREGABALIN 150 MG/1
150 CAPSULE ORAL EVERY 8 HOURS
Qty: 270 CAPSULE | Refills: 1 | Status: SHIPPED | OUTPATIENT
Start: 2024-01-09 | End: 2024-01-09

## 2024-01-09 RX ORDER — PREGABALIN 150 MG/1
150 CAPSULE ORAL EVERY 8 HOURS
Qty: 270 CAPSULE | Refills: 1 | Status: SHIPPED | OUTPATIENT
Start: 2024-01-09 | End: 2024-07-07

## 2024-02-26 NOTE — PROGRESS NOTES
Subjective:         Patient ID: Monserrat Lee is a 38 y.o. female.    Chief Complaint: Low-back Pain and Hip Pain        Pain  This is a chronic problem. The current episode started more than 1 year ago. The problem occurs daily. The problem has been unchanged. Associated symptoms include arthralgias and neck pain. Pertinent negatives include no anorexia, chest pain, chills, coughing, diaphoresis, fever, sore throat, vertigo or vomiting.     Review of Systems   Constitutional:  Negative for activity change, appetite change, chills, diaphoresis, fever and unexpected weight change.   HENT:  Negative for drooling, ear discharge, ear pain, facial swelling, nosebleeds, sore throat, trouble swallowing, voice change and goiter.    Eyes:  Negative for photophobia, pain, discharge, redness and visual disturbance.   Respiratory:  Negative for apnea, cough, choking, chest tightness, shortness of breath, wheezing and stridor.    Cardiovascular:  Negative for chest pain, palpitations and leg swelling.   Gastrointestinal:  Negative for abdominal distention, anorexia, diarrhea, rectal pain, vomiting and fecal incontinence.   Endocrine: Negative for cold intolerance, heat intolerance, polydipsia, polyphagia and polyuria.   Genitourinary:  Negative for bladder incontinence, dysuria, flank pain, frequency and hot flashes.   Musculoskeletal:  Positive for arthralgias, back pain, leg pain and neck pain.   Integumentary:  Negative for color change and pallor.   Allergic/Immunologic: Negative for immunocompromised state.   Neurological:  Negative for dizziness, vertigo, seizures, syncope, facial asymmetry, speech difficulty, light-headedness, memory loss and coordination difficulties.   Hematological:  Negative for adenopathy. Does not bruise/bleed easily.   Psychiatric/Behavioral:  Negative for agitation, behavioral problems, confusion, decreased concentration, dysphoric mood, hallucinations, self-injury and suicidal ideas. The  "patient is not nervous/anxious and is not hyperactive.            Past Medical History:   Diagnosis Date    Anxiety disorder     Carpal tunnel syndrome     Cervical radiculopathy     Cervical spondylosis without myelopathy     Chronic pain syndrome     Depressive disorder     Herpes simplex     Insomnia     Low back pain     Lumbosacral radiculopathy     Sacroiliitis, not elsewhere classified      Past Surgical History:   Procedure Laterality Date    FOOT SURGERY Left     Cyst Removal Left Foot    TUBAL LIGATION  2008     Social History     Socioeconomic History    Marital status:    Tobacco Use    Smoking status: Every Day     Current packs/day: 1.00     Types: Cigarettes    Smokeless tobacco: Never   Substance and Sexual Activity    Alcohol use: Not Currently    Drug use: Yes     Types: Hydrocodone     Family History   Problem Relation Age of Onset    Hypertension Father      Review of patient's allergies indicates:  No Known Allergies     Objective:  Vitals:    02/28/24 1036   BP: (!) 140/81   Pulse: 93   Resp: 18   Weight: 88.5 kg (195 lb)   Height: 5' 8" (1.727 m)   PainSc:   4           Physical Exam  Vitals and nursing note reviewed. Exam conducted with a chaperone present.   Constitutional:       General: She is awake. She is not in acute distress.     Appearance: Normal appearance. She is not ill-appearing or diaphoretic.   HENT:      Head: Normocephalic and atraumatic.      Nose: Nose normal.      Mouth/Throat:      Mouth: Mucous membranes are moist.      Pharynx: Oropharynx is clear.   Eyes:      Conjunctiva/sclera: Conjunctivae normal.      Pupils: Pupils are equal, round, and reactive to light.   Cardiovascular:      Rate and Rhythm: Normal rate.   Pulmonary:      Effort: Pulmonary effort is normal. No respiratory distress.   Abdominal:      Palpations: Abdomen is soft.   Musculoskeletal:         General: Normal range of motion.      Right shoulder: Tenderness present.      Left shoulder: " Tenderness present.      Right wrist: Tenderness present.      Left wrist: Tenderness present.      Cervical back: Normal range of motion and neck supple. Tenderness present.      Thoracic back: Tenderness present.      Lumbar back: Tenderness present.   Skin:     General: Skin is warm and dry.   Neurological:      General: No focal deficit present.      Mental Status: She is alert and oriented to person, place, and time. Mental status is at baseline.      Cranial Nerves: No cranial nerve deficit (II-XII).   Psychiatric:         Mood and Affect: Mood normal.         Behavior: Behavior normal. Behavior is cooperative.         Thought Content: Thought content normal.           No image results found.         Office Visit on 12/04/2023   Component Date Value Ref Range Status    POC Amphetamines 12/04/2023 Presumptive Positive (A)  Negative, Inconclusive Final    POC Barbiturates 12/04/2023 Negative  Negative, Inconclusive Final    POC Benzodiazepines 12/04/2023 Negative  Negative, Inconclusive Final    POC Cocaine 12/04/2023 Negative  Negative, Inconclusive Final    POC THC 12/04/2023 Negative  Negative, Inconclusive Final    POC Methadone 12/04/2023 Negative  Negative, Inconclusive Final    POC Methamphetamine 12/04/2023 Negative  Negative, Inconclusive Final    POC Opiates 12/04/2023 Presumptive Positive (A)  Negative, Inconclusive Final    POC Oxycodone 12/04/2023 Negative  Negative, Inconclusive Final    POC Phencyclidine 12/04/2023 Negative  Negative, Inconclusive Final    POC Methylenedioxymethamphetamine * 12/04/2023 Negative  Negative, Inconclusive Final    POC Tricyclic Antidepressants 12/04/2023 Negative  Negative, Inconclusive Final    POC Buprenorphine 12/04/2023 Negative   Final     Acceptable 12/04/2023 Yes   Final    POC Temperature (Urine) 12/04/2023 92   Final   Office Visit on 09/06/2023   Component Date Value Ref Range Status    POC Amphetamines 09/06/2023 Presumptive Positive (A)   Negative, Inconclusive Final    POC Barbiturates 09/06/2023 Negative  Negative, Inconclusive Final    POC Benzodiazepines 09/06/2023 Negative  Negative, Inconclusive Final    POC Cocaine 09/06/2023 Negative  Negative, Inconclusive Final    POC THC 09/06/2023 Negative  Negative, Inconclusive Final    POC Methadone 09/06/2023 Negative  Negative, Inconclusive Final    POC Methamphetamine 09/06/2023 Negative  Negative, Inconclusive Final    POC Opiates 09/06/2023 Presumptive Positive (A)  Negative, Inconclusive Final    POC Oxycodone 09/06/2023 Negative  Negative, Inconclusive Final    POC Phencyclidine 09/06/2023 Negative  Negative, Inconclusive Final    POC Methylenedioxymethamphetamine * 09/06/2023 Negative  Negative, Inconclusive Final    POC Tricyclic Antidepressants 09/06/2023 Negative  Negative, Inconclusive Final    POC Buprenorphine 09/06/2023 Negative   Final     Acceptable 09/06/2023 Yes   Final    POC Temperature (Urine) 09/06/2023 90   Final         Orders Placed This Encounter   Procedures    Vitamin D     Standing Status:   Future     Standing Expiration Date:   5/28/2024    Vitamin B6     Standing Status:   Future     Standing Expiration Date:   5/28/2024    Vitamin B12     Standing Status:   Future     Standing Expiration Date:   5/28/2024    Prealbumin     Standing Status:   Future     Standing Expiration Date:   5/28/2024    Comprehensive Metabolic Panel     Standing Status:   Future     Standing Expiration Date:   5/28/2024    CBC Auto Differential     Standing Status:   Future     Standing Expiration Date:   5/28/2024    Magnesium     Standing Status:   Future     Standing Expiration Date:   5/28/2024    POCT Urine Drug Screen Presump     Interpretive Information:     Negative:  No drug detected at the cut off level.   Positive:  This result represents presumptive positive for the   tested drug, other substances may yield a positive response other   than the analyte of interest. This  result should be utilized for   diagnostic purpose only. Confirmation testing will be performed upon physician request only.            Requested Prescriptions     Signed Prescriptions Disp Refills    amitriptyline (ELAVIL) 50 MG tablet 90 tablet 2     Sig: Take 1 tablet (50 mg total) by mouth every evening.    HYDROcodone-acetaminophen (NORCO) 7.5-325 mg per tablet 90 tablet 0     Sig: Take 1 tablet by mouth every 8 (eight) hours.    pregabalin (LYRICA) 150 MG capsule 270 capsule 1     Sig: Take 1 capsule (150 mg total) by mouth every 8 (eight) hours.    tiZANidine (ZANAFLEX) 4 MG tablet 270 tablet 1     Sig: Take 1 tablet (4 mg total) by mouth every 8 (eight) hours.    HYDROcodone-acetaminophen (NORCO) 7.5-325 mg per tablet 90 tablet 0     Sig: Take 1 tablet by mouth every 8 (eight) hours.    HYDROcodone-acetaminophen (NORCO) 7.5-325 mg per tablet 90 tablet 0     Sig: Take 1 tablet by mouth every 8 (eight) hours.       Assessment:     1. Lumbosacral spondylosis without myelopathy    2. Cervical spondylosis without myelopathy    3. Polyarthralgia    4. Encounter for long-term (current) use of other medications    5. Vitamin D deficiency    6. Myalgia         A's of Opioid Risk Assessment  Activity:Patient can perform ADL.   Analgesia:Patients pain is partially controlled by current medication. Patient has tried OTC medications such as Tylenol and Ibuprofen with out relief.   Adverse Effects: Patient denies constipation or sedation.  Aberrant Behavior:  reviewed with no aberrant drug seeking/taking behavior.  Overdose reversal drug naloxone discussed    Drug screen reviewed      Plan:    Prefers printed prescription    Narcan December 2022    Self-pay     Complaint increasing joint pain muscle pain weakness    Requesting redraw labs vitamin-D etc.     Otherwise she would like to continue with current medication conservative management    Continue home exercise program as directed    Continue current  medication    Follow-up 3 months     Dr. Velarde, September 2024    Bring original prescription medication bottles/container/box with labels to each visit

## 2024-02-28 ENCOUNTER — OFFICE VISIT (OUTPATIENT)
Dept: PAIN MEDICINE | Facility: CLINIC | Age: 39
End: 2024-02-28

## 2024-02-28 VITALS
DIASTOLIC BLOOD PRESSURE: 81 MMHG | HEIGHT: 68 IN | HEART RATE: 93 BPM | WEIGHT: 195 LBS | BODY MASS INDEX: 29.55 KG/M2 | RESPIRATION RATE: 18 BRPM | SYSTOLIC BLOOD PRESSURE: 140 MMHG

## 2024-02-28 DIAGNOSIS — M79.10 MYALGIA: ICD-10-CM

## 2024-02-28 DIAGNOSIS — M25.50 POLYARTHRALGIA: Chronic | ICD-10-CM

## 2024-02-28 DIAGNOSIS — M47.812 CERVICAL SPONDYLOSIS WITHOUT MYELOPATHY: Chronic | ICD-10-CM

## 2024-02-28 DIAGNOSIS — M47.817 LUMBOSACRAL SPONDYLOSIS WITHOUT MYELOPATHY: Primary | Chronic | ICD-10-CM

## 2024-02-28 DIAGNOSIS — Z79.899 ENCOUNTER FOR LONG-TERM (CURRENT) USE OF OTHER MEDICATIONS: ICD-10-CM

## 2024-02-28 DIAGNOSIS — E55.9 VITAMIN D DEFICIENCY: ICD-10-CM

## 2024-02-28 PROCEDURE — 99999PBSHW POCT URINE DRUG SCREEN PRESUMP: Mod: PBBFAC,,,

## 2024-02-28 PROCEDURE — 80305 DRUG TEST PRSMV DIR OPT OBS: CPT | Mod: PBBFAC | Performed by: PHYSICIAN ASSISTANT

## 2024-02-28 PROCEDURE — 99214 OFFICE O/P EST MOD 30 MIN: CPT | Mod: PBBFAC | Performed by: PHYSICIAN ASSISTANT

## 2024-02-28 PROCEDURE — 99214 OFFICE O/P EST MOD 30 MIN: CPT | Mod: S$PBB,,, | Performed by: PHYSICIAN ASSISTANT

## 2024-02-28 RX ORDER — HYDROCODONE BITARTRATE AND ACETAMINOPHEN 7.5; 325 MG/1; MG/1
1 TABLET ORAL EVERY 8 HOURS
Qty: 90 TABLET | Refills: 0 | Status: SHIPPED | OUTPATIENT
Start: 2024-05-21 | End: 2024-06-03 | Stop reason: SDUPTHER

## 2024-02-28 RX ORDER — AMITRIPTYLINE HYDROCHLORIDE 50 MG/1
50 TABLET, FILM COATED ORAL NIGHTLY
Qty: 90 TABLET | Refills: 2 | Status: SHIPPED | OUTPATIENT
Start: 2024-02-28 | End: 2024-06-03 | Stop reason: SDUPTHER

## 2024-02-28 RX ORDER — HYDROCODONE BITARTRATE AND ACETAMINOPHEN 7.5; 325 MG/1; MG/1
1 TABLET ORAL EVERY 8 HOURS
Qty: 90 TABLET | Refills: 0 | Status: SHIPPED | OUTPATIENT
Start: 2024-04-21 | End: 2024-06-03 | Stop reason: SDUPTHER

## 2024-02-28 RX ORDER — TIZANIDINE 4 MG/1
4 TABLET ORAL EVERY 8 HOURS
Qty: 270 TABLET | Refills: 1 | Status: SHIPPED | OUTPATIENT
Start: 2024-02-28 | End: 2024-06-03 | Stop reason: SDUPTHER

## 2024-02-28 RX ORDER — HYDROCODONE BITARTRATE AND ACETAMINOPHEN 7.5; 325 MG/1; MG/1
1 TABLET ORAL EVERY 8 HOURS
Qty: 90 TABLET | Refills: 0 | Status: SHIPPED | OUTPATIENT
Start: 2024-05-21 | End: 2024-02-28

## 2024-02-28 RX ORDER — DICLOFENAC SODIUM 75 MG/1
75 TABLET, DELAYED RELEASE ORAL 2 TIMES DAILY
Qty: 60 TABLET | Refills: 5 | Status: CANCELLED | OUTPATIENT
Start: 2024-02-28

## 2024-02-28 RX ORDER — HYDROCODONE BITARTRATE AND ACETAMINOPHEN 7.5; 325 MG/1; MG/1
1 TABLET ORAL EVERY 8 HOURS
Qty: 90 TABLET | Refills: 0 | Status: SHIPPED | OUTPATIENT
Start: 2024-03-22 | End: 2024-06-03 | Stop reason: SDUPTHER

## 2024-02-28 RX ORDER — PREGABALIN 150 MG/1
150 CAPSULE ORAL EVERY 8 HOURS
Qty: 270 CAPSULE | Refills: 1 | Status: SHIPPED | OUTPATIENT
Start: 2024-02-28 | End: 2024-08-26

## 2024-02-28 RX ORDER — HYDROCODONE BITARTRATE AND ACETAMINOPHEN 7.5; 325 MG/1; MG/1
1 TABLET ORAL EVERY 8 HOURS
Qty: 90 TABLET | Refills: 0 | Status: SHIPPED | OUTPATIENT
Start: 2024-02-28 | End: 2024-02-28

## 2024-06-03 NOTE — PROGRESS NOTES
Subjective:         Patient ID: Monserrat Lee is a 38 y.o. female.    Chief Complaint: Hip Pain and Low-back Pain        Pain  This is a chronic problem. The current episode started more than 1 year ago. The problem occurs daily. The problem has been waxing and waning. Associated symptoms include arthralgias and neck pain. Pertinent negatives include no anorexia, chest pain, chills, coughing, diaphoresis, fever, sore throat, vertigo or vomiting.     Review of Systems   Constitutional:  Negative for activity change, appetite change, chills, diaphoresis, fever and unexpected weight change.   HENT:  Negative for drooling, ear discharge, ear pain, facial swelling, nosebleeds, sore throat, trouble swallowing, voice change and goiter.    Eyes:  Negative for photophobia, pain, discharge, redness and visual disturbance.   Respiratory:  Negative for apnea, cough, choking, chest tightness, shortness of breath, wheezing and stridor.    Cardiovascular:  Negative for chest pain, palpitations and leg swelling.   Gastrointestinal:  Negative for abdominal distention, anorexia, diarrhea, rectal pain, vomiting and fecal incontinence.   Endocrine: Negative for cold intolerance, heat intolerance, polydipsia, polyphagia and polyuria.   Genitourinary:  Negative for bladder incontinence, dysuria, flank pain, frequency and hot flashes.   Musculoskeletal:  Positive for arthralgias, back pain, leg pain and neck pain.   Integumentary:  Negative for color change and pallor.   Allergic/Immunologic: Negative for immunocompromised state.   Neurological:  Negative for dizziness, vertigo, seizures, syncope, facial asymmetry, speech difficulty, light-headedness, memory loss and coordination difficulties.   Hematological:  Negative for adenopathy. Does not bruise/bleed easily.   Psychiatric/Behavioral:  Negative for agitation, behavioral problems, confusion, decreased concentration, dysphoric mood, hallucinations, self-injury and suicidal ideas.  "The patient is not nervous/anxious and is not hyperactive.            Past Medical History:   Diagnosis Date    Anxiety disorder     Carpal tunnel syndrome     Cervical radiculopathy     Cervical spondylosis without myelopathy     Chronic pain syndrome     Depressive disorder     Herpes simplex     Insomnia     Low back pain     Lumbosacral radiculopathy     Sacroiliitis, not elsewhere classified      Past Surgical History:   Procedure Laterality Date    FOOT SURGERY Left     Cyst Removal Left Foot    TUBAL LIGATION  2008     Social History     Socioeconomic History    Marital status:    Tobacco Use    Smoking status: Every Day     Current packs/day: 1.00     Types: Cigarettes    Smokeless tobacco: Never   Substance and Sexual Activity    Alcohol use: Not Currently    Drug use: Yes     Types: Hydrocodone     Family History   Problem Relation Name Age of Onset    Hypertension Father       Review of patient's allergies indicates:  No Known Allergies     Objective:  Vitals:    06/10/24 0949   BP: 139/79   Pulse: 89   Resp: 18   Weight: 90.3 kg (199 lb)   Height: 5' 8" (1.727 m)   PainSc:   7             Physical Exam  Vitals and nursing note reviewed. Exam conducted with a chaperone present.   Constitutional:       General: She is awake. She is not in acute distress.     Appearance: Normal appearance. She is not ill-appearing or diaphoretic.   HENT:      Head: Normocephalic and atraumatic.      Nose: Nose normal.      Mouth/Throat:      Mouth: Mucous membranes are moist.      Pharynx: Oropharynx is clear.   Eyes:      Conjunctiva/sclera: Conjunctivae normal.      Pupils: Pupils are equal, round, and reactive to light.   Cardiovascular:      Rate and Rhythm: Normal rate.   Pulmonary:      Effort: Pulmonary effort is normal. No respiratory distress.   Abdominal:      Palpations: Abdomen is soft.   Musculoskeletal:         General: Normal range of motion.      Right shoulder: Tenderness present.      Left shoulder: " Tenderness present.      Right wrist: Tenderness present.      Left wrist: Tenderness present.      Cervical back: Normal range of motion and neck supple. Tenderness present.      Thoracic back: Tenderness present.      Lumbar back: Tenderness present.   Skin:     General: Skin is warm and dry.   Neurological:      General: No focal deficit present.      Mental Status: She is alert and oriented to person, place, and time. Mental status is at baseline.      Cranial Nerves: No cranial nerve deficit (II-XII).   Psychiatric:         Mood and Affect: Mood normal.         Behavior: Behavior normal. Behavior is cooperative.         Thought Content: Thought content normal.           No image results found.         Lab Visit on 02/28/2024   Component Date Value Ref Range Status    Vitamin D 25-Hydroxy, Blood 02/28/2024 29.4  ng/mL Final    Vitamin B12 02/28/2024 316  193 - 986 pg/mL Final    Prealbumin 02/28/2024 32  20 - 40 mg/dL Final    Sodium 02/28/2024 139  136 - 145 mmol/L Final    Potassium 02/28/2024 4.3  3.5 - 5.1 mmol/L Final    Chloride 02/28/2024 107  98 - 107 mmol/L Final    CO2 02/28/2024 28  21 - 32 mmol/L Final    Anion Gap 02/28/2024 8  7 - 16 mmol/L Final    Glucose 02/28/2024 104  74 - 106 mg/dL Final    BUN 02/28/2024 12  7 - 18 mg/dL Final    Creatinine 02/28/2024 0.85  0.55 - 1.02 mg/dL Final    BUN/Creatinine Ratio 02/28/2024 14  6 - 20 Final    Calcium 02/28/2024 9.0  8.5 - 10.1 mg/dL Final    Total Protein 02/28/2024 7.3  6.4 - 8.2 g/dL Final    Albumin 02/28/2024 3.8  3.5 - 5.0 g/dL Final    Globulin 02/28/2024 3.5  2.0 - 4.0 g/dL Final    A/G Ratio 02/28/2024 1.1   Final    Bilirubin, Total 02/28/2024 0.5  >0.0 - 1.2 mg/dL Final    Alk Phos 02/28/2024 112 (H)  37 - 98 U/L Final    ALT 02/28/2024 57 (H)  13 - 56 U/L Final    AST 02/28/2024 27  15 - 37 U/L Final    eGFR 02/28/2024 90  >=60 mL/min/1.73m2 Final    Magnesium 02/28/2024 2.1  1.7 - 2.3 mg/dL Final    WBC 02/28/2024 10.99  4.50 - 11.00  K/uL Final    RBC 02/28/2024 4.96  4.20 - 5.40 M/uL Final    Hemoglobin 02/28/2024 14.4  12.0 - 16.0 g/dL Final    Hematocrit 02/28/2024 42.4  38.0 - 47.0 % Final    MCV 02/28/2024 85.5  80.0 - 96.0 fL Final    MCH 02/28/2024 29.0  27.0 - 31.0 pg Final    MCHC 02/28/2024 34.0  32.0 - 36.0 g/dL Final    RDW 02/28/2024 12.9  11.5 - 14.5 % Final    Platelet Count 02/28/2024 283  150 - 400 K/uL Final    MPV 02/28/2024 11.7  9.4 - 12.4 fL Final    Neutrophils % 02/28/2024 62.8  53.0 - 65.0 % Final    Lymphocytes % 02/28/2024 30.3  27.0 - 41.0 % Final    Monocytes % 02/28/2024 5.0  2.0 - 6.0 % Final    Eosinophils % 02/28/2024 1.1  1.0 - 4.0 % Final    Basophils % 02/28/2024 0.5  0.0 - 1.0 % Final    Immature Granulocytes % 02/28/2024 0.3  0.0 - 0.4 % Final    nRBC, Auto 02/28/2024 0.0  <=0.0 % Final    Neutrophils, Abs 02/28/2024 6.90  1.80 - 7.70 K/uL Final    Lymphocytes, Absolute 02/28/2024 3.33  1.00 - 4.80 K/uL Final    Monocytes, Absolute 02/28/2024 0.55  0.00 - 0.80 K/uL Final    Eosinophils, Absolute 02/28/2024 0.12  0.00 - 0.50 K/uL Final    Basophils, Absolute 02/28/2024 0.06  0.00 - 0.20 K/uL Final    Immature Granulocytes, Absolute 02/28/2024 0.03  0.00 - 0.04 K/uL Final    nRBC, Absolute 02/28/2024 0.00  <=0.00 x10e3/uL Final    Diff Type 02/28/2024 Auto   Final   Office Visit on 02/28/2024   Component Date Value Ref Range Status    POC Amphetamines 02/28/2024 Negative  Negative, Inconclusive Final    POC Barbiturates 02/28/2024 Negative  Negative, Inconclusive Final    POC Benzodiazepines 02/28/2024 Negative  Negative, Inconclusive Final    POC Cocaine 02/28/2024 Negative  Negative, Inconclusive Final    POC THC 02/28/2024 Negative  Negative, Inconclusive Final    POC Methadone 02/28/2024 Negative  Negative, Inconclusive Final    POC Methamphetamine 02/28/2024 Negative  Negative, Inconclusive Final    POC Opiates 02/28/2024 Presumptive Positive (A)  Negative, Inconclusive Final    POC Oxycodone 02/28/2024  Negative  Negative, Inconclusive Final    POC Phencyclidine 02/28/2024 Negative  Negative, Inconclusive Final    POC Methylenedioxymethamphetamine * 02/28/2024 Negative  Negative, Inconclusive Final    POC Tricyclic Antidepressants 02/28/2024 Negative  Negative, Inconclusive Final    POC Buprenorphine 02/28/2024 Negative   Final     Acceptable 02/28/2024 Yes   Final    POC Temperature (Urine) 02/28/2024 94   Final         Orders Placed This Encounter   Procedures    POCT Urine Drug Screen Presump     Interpretive Information:     Negative:  No drug detected at the cut off level.   Positive:  This result represents presumptive positive for the   tested drug, other substances may yield a positive response other   than the analyte of interest. This result should be utilized for   diagnostic purpose only. Confirmation testing will be performed upon physician request only.            Requested Prescriptions     Signed Prescriptions Disp Refills    amitriptyline (ELAVIL) 50 MG tablet 90 tablet 2     Sig: Take 1 tablet (50 mg total) by mouth every evening.    HYDROcodone-acetaminophen (NORCO) 7.5-325 mg per tablet 90 tablet 0     Sig: Take 1 tablet by mouth every 8 (eight) hours.    HYDROcodone-acetaminophen (NORCO) 7.5-325 mg per tablet 90 tablet 0     Sig: Take 1 tablet by mouth every 8 (eight) hours.    HYDROcodone-acetaminophen (NORCO) 7.5-325 mg per tablet 90 tablet 0     Sig: Take 1 tablet by mouth every 8 (eight) hours.    tiZANidine (ZANAFLEX) 4 MG tablet 270 tablet 1     Sig: Take 1 tablet (4 mg total) by mouth every 8 (eight) hours.       Assessment:     1. Encounter for long-term (current) use of other medications    2. Cervical spondylosis without myelopathy    3. Lumbosacral spondylosis without myelopathy    4. Polyarthralgia           A's of Opioid Risk Assessment  Activity:Patient can perform ADL.   Analgesia:Patients pain is partially controlled by current medication. Patient has tried OTC  medications such as Tylenol and Ibuprofen with out relief.   Adverse Effects: Patient denies constipation or sedation.  Aberrant Behavior:  reviewed with no aberrant drug seeking/taking behavior.  Overdose reversal drug naloxone discussed    Drug screen reviewed      Plan:    Prefers printed prescription    Narcan June 2024    Self-pay     Considering physical therapy but due to lack of insurance she can not afford at this time        No new complaints today she states current medications helping control her discomfort     She would like to continue with current treatment    Continue home exercise program as directed    Continue current medication    Follow-up 3 months     Dr. Velarde, September 2024    Bring original prescription medication bottles/container/box with labels to each visit

## 2024-06-10 ENCOUNTER — OFFICE VISIT (OUTPATIENT)
Dept: PAIN MEDICINE | Facility: CLINIC | Age: 39
End: 2024-06-10

## 2024-06-10 VITALS
DIASTOLIC BLOOD PRESSURE: 79 MMHG | SYSTOLIC BLOOD PRESSURE: 139 MMHG | HEIGHT: 68 IN | RESPIRATION RATE: 18 BRPM | WEIGHT: 199 LBS | HEART RATE: 89 BPM | BODY MASS INDEX: 30.16 KG/M2

## 2024-06-10 DIAGNOSIS — M47.812 CERVICAL SPONDYLOSIS WITHOUT MYELOPATHY: Chronic | ICD-10-CM

## 2024-06-10 DIAGNOSIS — M47.817 LUMBOSACRAL SPONDYLOSIS WITHOUT MYELOPATHY: Chronic | ICD-10-CM

## 2024-06-10 DIAGNOSIS — M25.50 POLYARTHRALGIA: Chronic | ICD-10-CM

## 2024-06-10 DIAGNOSIS — Z79.899 ENCOUNTER FOR LONG-TERM (CURRENT) USE OF OTHER MEDICATIONS: Primary | ICD-10-CM

## 2024-06-10 PROCEDURE — 99214 OFFICE O/P EST MOD 30 MIN: CPT | Mod: S$PBB,,, | Performed by: PHYSICIAN ASSISTANT

## 2024-06-10 PROCEDURE — 99999PBSHW POCT URINE DRUG SCREEN PRESUMP: Mod: PBBFAC,,,

## 2024-06-10 PROCEDURE — 99214 OFFICE O/P EST MOD 30 MIN: CPT | Mod: PBBFAC | Performed by: PHYSICIAN ASSISTANT

## 2024-06-10 PROCEDURE — 80305 DRUG TEST PRSMV DIR OPT OBS: CPT | Mod: PBBFAC | Performed by: PHYSICIAN ASSISTANT

## 2024-06-10 RX ORDER — HYDROCODONE BITARTRATE AND ACETAMINOPHEN 7.5; 325 MG/1; MG/1
1 TABLET ORAL EVERY 8 HOURS
Qty: 90 TABLET | Refills: 0 | Status: SHIPPED | OUTPATIENT
Start: 2024-08-20

## 2024-06-10 RX ORDER — HYDROCODONE BITARTRATE AND ACETAMINOPHEN 7.5; 325 MG/1; MG/1
1 TABLET ORAL EVERY 8 HOURS
Qty: 90 TABLET | Refills: 0 | Status: SHIPPED | OUTPATIENT
Start: 2024-07-21

## 2024-06-10 RX ORDER — HYDROCODONE BITARTRATE AND ACETAMINOPHEN 7.5; 325 MG/1; MG/1
1 TABLET ORAL EVERY 8 HOURS
Qty: 90 TABLET | Refills: 0 | Status: SHIPPED | OUTPATIENT
Start: 2024-06-21

## 2024-06-10 RX ORDER — AMITRIPTYLINE HYDROCHLORIDE 50 MG/1
50 TABLET, FILM COATED ORAL NIGHTLY
Qty: 90 TABLET | Refills: 2 | Status: SHIPPED | OUTPATIENT
Start: 2024-06-10

## 2024-06-10 RX ORDER — TIZANIDINE 4 MG/1
4 TABLET ORAL EVERY 8 HOURS
Qty: 270 TABLET | Refills: 1 | Status: SHIPPED | OUTPATIENT
Start: 2024-06-10

## 2024-07-24 ENCOUNTER — PATIENT MESSAGE (OUTPATIENT)
Dept: PAIN MEDICINE | Facility: CLINIC | Age: 39
End: 2024-07-24

## 2024-08-12 ENCOUNTER — PATIENT MESSAGE (OUTPATIENT)
Dept: PAIN MEDICINE | Facility: CLINIC | Age: 39
End: 2024-08-12

## 2024-08-12 DIAGNOSIS — M25.50 POLYARTHRALGIA: Chronic | ICD-10-CM

## 2024-08-12 DIAGNOSIS — M47.812 CERVICAL SPONDYLOSIS WITHOUT MYELOPATHY: Chronic | ICD-10-CM

## 2024-08-12 DIAGNOSIS — M47.817 LUMBOSACRAL SPONDYLOSIS WITHOUT MYELOPATHY: Chronic | ICD-10-CM

## 2024-08-13 RX ORDER — DICLOFENAC SODIUM 75 MG/1
75 TABLET, DELAYED RELEASE ORAL 2 TIMES DAILY
Qty: 60 TABLET | Refills: 5 | Status: SHIPPED | OUTPATIENT
Start: 2024-08-13

## 2024-08-19 ENCOUNTER — OFFICE VISIT (OUTPATIENT)
Dept: FAMILY MEDICINE | Facility: CLINIC | Age: 39
End: 2024-08-19

## 2024-08-19 VITALS
OXYGEN SATURATION: 99 % | WEIGHT: 196 LBS | TEMPERATURE: 99 F | RESPIRATION RATE: 14 BRPM | DIASTOLIC BLOOD PRESSURE: 74 MMHG | HEIGHT: 68 IN | BODY MASS INDEX: 29.7 KG/M2 | SYSTOLIC BLOOD PRESSURE: 127 MMHG | HEART RATE: 89 BPM

## 2024-08-19 DIAGNOSIS — T75.3XXS MOTION SICKNESS, SEQUELA: ICD-10-CM

## 2024-08-19 DIAGNOSIS — L02.214 SOFT TISSUE ABSCESS OF INGUINAL REGION: Primary | ICD-10-CM

## 2024-08-19 PROCEDURE — 99203 OFFICE O/P NEW LOW 30 MIN: CPT | Mod: 25,,, | Performed by: FAMILY MEDICINE

## 2024-08-19 PROCEDURE — 96372 THER/PROPH/DIAG INJ SC/IM: CPT | Mod: ,,, | Performed by: FAMILY MEDICINE

## 2024-08-19 RX ORDER — CEFAZOLIN SODIUM 1 G/3ML
1 INJECTION, POWDER, FOR SOLUTION INTRAMUSCULAR; INTRAVENOUS
Status: SHIPPED | OUTPATIENT
Start: 2024-08-19

## 2024-08-19 RX ORDER — PROMETHAZINE HYDROCHLORIDE 25 MG/1
25 TABLET ORAL EVERY 6 HOURS PRN
Qty: 30 TABLET | Refills: 1 | Status: SHIPPED | OUTPATIENT
Start: 2024-08-19

## 2024-08-19 RX ORDER — SULFAMETHOXAZOLE AND TRIMETHOPRIM 800; 160 MG/1; MG/1
1 TABLET ORAL 2 TIMES DAILY
Qty: 20 TABLET | Refills: 0 | Status: SHIPPED | OUTPATIENT
Start: 2024-08-19

## 2024-08-19 RX ORDER — CEFAZOLIN SODIUM 1 G/3ML
1 INJECTION, POWDER, FOR SOLUTION INTRAMUSCULAR; INTRAVENOUS
Status: COMPLETED | OUTPATIENT
Start: 2024-08-19 | End: 2024-08-19

## 2024-08-19 RX ORDER — MUPIROCIN 20 MG/G
OINTMENT TOPICAL
Qty: 30 G | Refills: 2 | Status: SHIPPED | OUTPATIENT
Start: 2024-08-19

## 2024-08-19 RX ORDER — CEFTRIAXONE 1 G/1
1 INJECTION, POWDER, FOR SOLUTION INTRAMUSCULAR; INTRAVENOUS
Status: SHIPPED | OUTPATIENT
Start: 2024-08-19

## 2024-08-19 RX ADMIN — CEFAZOLIN SODIUM 1 G: 1 INJECTION, POWDER, FOR SOLUTION INTRAMUSCULAR; INTRAVENOUS at 09:08

## 2024-08-19 NOTE — PROGRESS NOTES
Venkatesh Bah MD    [address] [place] [address]     PATIENT NAME: Monserrat Lee  : 1985  DATE: 24  MRN: [id number]      Billing Provider: Venkatesh Bah MD  Level of Service: IA OFFICE/OUTPT VISIT, NEW, CAYETANO III, 30-44 MIN  Patient PCP Information       Provider PCP Type    Sherry Champion, RONALD, FNP General            Reason for Visit / Chief Complaint: Recurrent Skin Infections (Pt is here with boils in her groin area. She states that it is between her vaginal and anal area. She states this is a recurrent issue for her. She is here to try to get something for it to help them from getting so bad. ) and car sickness (Pt is also wanting to see if she can get something for car sickness as well. She has been giving zofran but that has not worked well for her. )       Update PCP  Update Chief Complaint         History of Present Illness / Problem Focused Workflow     Monserrat Lee presents to the clinic with Recurrent Skin Infections (Pt is here with boils in her groin area. She states that it is between her vaginal and anal area. She states this is a recurrent issue for her. She is here to try to get something for it to help them from getting so bad. ) and car sickness (Pt is also wanting to see if she can get something for car sickness as well. She has been giving zofran but that has not worked well for her. )     New Patient, Walk in     She reports recurrent perineal infections. If she can get on antibiotics quick enough usually she can prevent from the becoming a major problem.         HPI    Review of Systems     Review of Systems   Constitutional:  Negative for activity change, appetite change, chills, fatigue and fever.   HENT:  Negative for nasal congestion, ear pain, hearing loss, postnasal drip and sore throat.    Respiratory:  Negative for cough, chest tightness, shortness of breath and wheezing.    Cardiovascular:  Negative for chest  pain, palpitations, leg swelling and claudication.   Gastrointestinal:  Negative for abdominal pain, change in bowel habit, constipation, diarrhea, nausea and vomiting.   Genitourinary:  Negative for dysuria.   Musculoskeletal:  Negative for arthralgias, back pain, gait problem and myalgias.   Integumentary:  Positive for mole/lesion. Negative for rash.   Neurological:  Positive for dizziness. Negative for weakness and headaches.   Psychiatric/Behavioral:  Negative for suicidal ideas. The patient is not nervous/anxious.         Medical / Social / Family History     Past Medical History:   Diagnosis Date    Anxiety disorder     Carpal tunnel syndrome     Cervical radiculopathy     Cervical spondylosis without myelopathy     Chronic pain syndrome     Depressive disorder     Herpes simplex     Insomnia     Low back pain     Lumbosacral radiculopathy     Sacroiliitis, not elsewhere classified        Past Surgical History:   Procedure Laterality Date    FOOT SURGERY Left     Cyst Removal Left Foot    TUBAL LIGATION  2008       Social History  Ms.  reports that she has been smoking cigarettes. She has never used smokeless tobacco. She reports that she does not currently use alcohol. She reports current drug use. Drug: Hydrocodone.    Family History  Ms.'s family history includes Hypertension in her father.    Medications and Allergies     Medications  Outpatient Medications Marked as Taking for the 8/19/24 encounter (Office Visit) with Venkatesh Bah MD   Medication Sig Dispense Refill    amitriptyline (ELAVIL) 50 MG tablet Take 1 tablet (50 mg total) by mouth every evening. 90 tablet 2    buPROPion (WELLBUTRIN XL) 150 MG TB24 tablet Take 150 mg by mouth.      diclofenac (VOLTAREN) 75 MG EC tablet Take 1 tablet (75 mg total) by mouth 2 (two) times daily. 60 tablet 5    diclofenac sodium (VOLTAREN ARTHRITIS PAIN) 1 % Gel Apply 2 g topically 2 (two) times daily. Apply 2 grams BID to knees, elbows, hips joints  bilaterally as needed 10 each 2    FLUoxetine 20 MG capsule Take 20 mg by mouth once daily.      HYDROcodone-acetaminophen (NORCO) 7.5-325 mg per tablet Take 1 tablet by mouth every 8 (eight) hours. 90 tablet 0    lisdexamfetamine (VYVANSE) 50 MG capsule Take 50 mg by mouth every morning.      pantoprazole (PROTONIX) 40 MG tablet Take 40 mg by mouth.      pregabalin (LYRICA) 150 MG capsule Take 1 capsule (150 mg total) by mouth every 8 (eight) hours. 270 capsule 1    tiZANidine (ZANAFLEX) 4 MG tablet Take 1 tablet (4 mg total) by mouth every 8 (eight) hours. 270 tablet 1     Current Facility-Administered Medications for the 8/19/24 encounter (Office Visit) with Venkatesh Bah MD   Medication Dose Route Frequency Provider Last Rate Last Admin    ceFAZolin injection 1 g  1 g Intravenous 1 time in Clinic/HOD Venkatesh Bah MD        cefTRIAXone injection 1 g  1 g Intramuscular 1 time in Clinic/HOD Venkatesh Bah MD           Allergies  Review of patient's allergies indicates:  No Known Allergies    Physical Examination     Vitals:    08/19/24 0813   BP: 127/74   Pulse: 89   Resp: 14   Temp: 98.6 °F (37 °C)     Physical Exam  Constitutional:       Appearance: Normal appearance.   HENT:      Head: Normocephalic and atraumatic.   Eyes:      Extraocular Movements: Extraocular movements intact.   Cardiovascular:      Rate and Rhythm: Normal rate and regular rhythm.   Pulmonary:      Effort: Pulmonary effort is normal.      Breath sounds: Normal breath sounds.   Skin:     General: Skin is warm.   Neurological:      Mental Status: She is alert and oriented to person, place, and time. Mental status is at baseline.            Assessment and Plan (including Health Maintenance)      Problem List  Smart Sets  Document Outside HM   :    Plan:     Started on bactrim, topical mupirocin, referred to Dermatology       Health Maintenance Due   Topic Date Due    Hepatitis C Screening  Never done    Cervical Cancer  Screening  Never done    Lipid Panel  Never done    Pneumococcal Vaccines (Age 0-64) (1 of 2 - PCV) Never done    HIV Screening  Never done    TETANUS VACCINE  Never done    Hemoglobin A1c (Diabetic Prevention Screening)  Never done    COVID-19 Vaccine (1 - 2023-24 season) Never done       Problem List Items Addressed This Visit    None  Visit Diagnoses       Soft tissue abscess of inguinal region    -  Primary    Relevant Medications    sulfamethoxazole-trimethoprim 800-160mg (BACTRIM DS) 800-160 mg Tab    ceFAZolin injection 1 g    mupirocin (BACTROBAN) 2 % ointment    cefTRIAXone injection 1 g    ceFAZolin injection 1 g (Completed)    Other Relevant Orders    Ambulatory referral/consult to Dermatology    Motion sickness, sequela        Relevant Medications    promethazine (PHENERGAN) 25 MG tablet            Health Maintenance Topics with due status: Not Due       Topic Last Completion Date    Influenza Vaccine Not Due       Procedures     Future Appointments   Date Time Provider Department Center   9/17/2024 10:30 AM Wilmer Anne PA [facility]   1/2/2025 10:30 AM Digna Dietrich MD [facility]        Follow up if symptoms worsen or fail to improve.     Signature:  Venkatesh Bah MD  [address] [place] [address]  Phone #: [phone]  Fax #: [phone]    Date of encounter: 8/19/24    There are no Patient Instructions on file for this visit.

## 2024-08-24 ENCOUNTER — PATIENT MESSAGE (OUTPATIENT)
Dept: FAMILY MEDICINE | Facility: CLINIC | Age: 39
End: 2024-08-24

## 2024-08-24 DIAGNOSIS — B37.9 CANDIDIASIS: Primary | ICD-10-CM

## 2024-08-26 ENCOUNTER — TELEPHONE (OUTPATIENT)
Dept: FAMILY MEDICINE | Facility: CLINIC | Age: 39
End: 2024-08-26

## 2024-08-26 RX ORDER — FLUCONAZOLE 100 MG/1
100 TABLET ORAL DAILY
Qty: 3 TABLET | Refills: 0 | Status: SHIPPED | OUTPATIENT
Start: 2024-08-26 | End: 2024-09-25

## 2024-08-26 NOTE — TELEPHONE ENCOUNTER
Pt left a voicemail stating that she was seen in the clinic last week for an abscess. We gave her Bactrim for treatment and she states she now has a yeast infection. She is requested that we send in Diflucan to her pharmacy. Please advise.

## 2024-09-17 ENCOUNTER — OFFICE VISIT (OUTPATIENT)
Dept: PAIN MEDICINE | Facility: CLINIC | Age: 39
End: 2024-09-17

## 2024-09-17 VITALS
BODY MASS INDEX: 28.88 KG/M2 | HEART RATE: 79 BPM | SYSTOLIC BLOOD PRESSURE: 146 MMHG | WEIGHT: 195 LBS | RESPIRATION RATE: 18 BRPM | HEIGHT: 69 IN | DIASTOLIC BLOOD PRESSURE: 77 MMHG

## 2024-09-17 DIAGNOSIS — M47.817 LUMBOSACRAL SPONDYLOSIS WITHOUT MYELOPATHY: Primary | Chronic | ICD-10-CM

## 2024-09-17 DIAGNOSIS — M25.50 POLYARTHRALGIA: Chronic | ICD-10-CM

## 2024-09-17 DIAGNOSIS — M47.812 CERVICAL SPONDYLOSIS WITHOUT MYELOPATHY: Chronic | ICD-10-CM

## 2024-09-17 DIAGNOSIS — Z79.899 ENCOUNTER FOR LONG-TERM (CURRENT) USE OF OTHER MEDICATIONS: ICD-10-CM

## 2024-09-17 PROCEDURE — 99999PBSHW PR PBB SHADOW TECHNICAL ONLY FILED TO HB: Mod: PBBFAC,,,

## 2024-09-17 PROCEDURE — 99999PBSHW POCT URINE DRUG SCREEN PRESUMP: Mod: PBBFAC,,,

## 2024-09-17 PROCEDURE — 99214 OFFICE O/P EST MOD 30 MIN: CPT | Mod: PBBFAC | Performed by: PHYSICIAN ASSISTANT

## 2024-09-17 PROCEDURE — 80305 DRUG TEST PRSMV DIR OPT OBS: CPT | Mod: PBBFAC | Performed by: PHYSICIAN ASSISTANT

## 2024-09-17 PROCEDURE — 99214 OFFICE O/P EST MOD 30 MIN: CPT | Mod: S$PBB,25,, | Performed by: PHYSICIAN ASSISTANT

## 2024-09-17 PROCEDURE — 99999 PR PBB SHADOW E&M-EST. PATIENT-LVL IV: CPT | Mod: PBBFAC,,, | Performed by: PHYSICIAN ASSISTANT

## 2024-09-17 PROCEDURE — 96372 THER/PROPH/DIAG INJ SC/IM: CPT | Mod: PBBFAC | Performed by: PHYSICIAN ASSISTANT

## 2024-09-17 RX ORDER — PREGABALIN 150 MG/1
150 CAPSULE ORAL EVERY 8 HOURS
Qty: 270 CAPSULE | Refills: 0 | Status: SHIPPED | OUTPATIENT
Start: 2024-09-17 | End: 2024-09-17

## 2024-09-17 RX ORDER — KETOROLAC TROMETHAMINE 30 MG/ML
60 INJECTION, SOLUTION INTRAMUSCULAR; INTRAVENOUS
Status: COMPLETED | OUTPATIENT
Start: 2024-09-17 | End: 2024-09-17

## 2024-09-17 RX ORDER — PREGABALIN 150 MG/1
150 CAPSULE ORAL EVERY 8 HOURS
Qty: 270 CAPSULE | Refills: 0 | Status: SHIPPED | OUTPATIENT
Start: 2024-09-17

## 2024-09-17 RX ORDER — HYDROCODONE BITARTRATE AND ACETAMINOPHEN 7.5; 325 MG/1; MG/1
1 TABLET ORAL EVERY 8 HOURS
Qty: 90 TABLET | Refills: 0 | Status: SHIPPED | OUTPATIENT
Start: 2024-09-23

## 2024-09-17 RX ORDER — TIZANIDINE 4 MG/1
4 TABLET ORAL EVERY 8 HOURS
Qty: 270 TABLET | Refills: 0 | Status: SHIPPED | OUTPATIENT
Start: 2024-09-17

## 2024-09-17 RX ORDER — HYDROCODONE BITARTRATE AND ACETAMINOPHEN 7.5; 325 MG/1; MG/1
1 TABLET ORAL EVERY 8 HOURS
Qty: 90 TABLET | Refills: 0 | Status: SHIPPED | OUTPATIENT
Start: 2024-11-22 | End: 2024-09-17

## 2024-09-17 RX ORDER — DICLOFENAC SODIUM 75 MG/1
75 TABLET, DELAYED RELEASE ORAL 2 TIMES DAILY
Qty: 60 TABLET | Refills: 2 | Status: SHIPPED | OUTPATIENT
Start: 2024-09-17

## 2024-09-17 RX ORDER — AMITRIPTYLINE HYDROCHLORIDE 50 MG/1
50 TABLET, FILM COATED ORAL NIGHTLY
Qty: 90 TABLET | Refills: 0 | Status: SHIPPED | OUTPATIENT
Start: 2024-09-17

## 2024-09-17 RX ORDER — HYDROCODONE BITARTRATE AND ACETAMINOPHEN 7.5; 325 MG/1; MG/1
1 TABLET ORAL EVERY 8 HOURS
Qty: 90 TABLET | Refills: 0 | Status: SHIPPED | OUTPATIENT
Start: 2024-11-22

## 2024-09-17 RX ORDER — HYDROCODONE BITARTRATE AND ACETAMINOPHEN 7.5; 325 MG/1; MG/1
1 TABLET ORAL EVERY 8 HOURS
Qty: 90 TABLET | Refills: 0 | Status: SHIPPED | OUTPATIENT
Start: 2024-10-23 | End: 2024-09-17

## 2024-09-17 RX ORDER — HYDROCODONE BITARTRATE AND ACETAMINOPHEN 7.5; 325 MG/1; MG/1
1 TABLET ORAL EVERY 8 HOURS
Qty: 90 TABLET | Refills: 0 | Status: SHIPPED | OUTPATIENT
Start: 2024-09-23 | End: 2024-09-17

## 2024-09-17 RX ORDER — HYDROCODONE BITARTRATE AND ACETAMINOPHEN 7.5; 325 MG/1; MG/1
1 TABLET ORAL EVERY 8 HOURS
Qty: 90 TABLET | Refills: 0 | Status: SHIPPED | OUTPATIENT
Start: 2024-10-23

## 2024-09-17 RX ADMIN — KETOROLAC TROMETHAMINE 60 MG: 30 INJECTION, SOLUTION INTRAMUSCULAR at 10:09

## 2024-10-12 DIAGNOSIS — B37.9 CANDIDIASIS: ICD-10-CM

## 2024-10-14 RX ORDER — FLUCONAZOLE 100 MG/1
100 TABLET ORAL
Qty: 3 TABLET | Refills: 0 | Status: SHIPPED | OUTPATIENT
Start: 2024-10-14

## 2024-10-30 ENCOUNTER — PATIENT MESSAGE (OUTPATIENT)
Dept: FAMILY MEDICINE | Facility: CLINIC | Age: 39
End: 2024-10-30

## 2024-10-31 ENCOUNTER — TELEPHONE (OUTPATIENT)
Dept: FAMILY MEDICINE | Facility: CLINIC | Age: 39
End: 2024-10-31

## 2024-11-22 ENCOUNTER — OFFICE VISIT (OUTPATIENT)
Dept: FAMILY MEDICINE | Facility: CLINIC | Age: 39
End: 2024-11-22

## 2024-11-22 VITALS
BODY MASS INDEX: 28.58 KG/M2 | SYSTOLIC BLOOD PRESSURE: 135 MMHG | HEIGHT: 69 IN | DIASTOLIC BLOOD PRESSURE: 89 MMHG | HEART RATE: 85 BPM | OXYGEN SATURATION: 98 % | TEMPERATURE: 98 F | RESPIRATION RATE: 18 BRPM | WEIGHT: 193 LBS

## 2024-11-22 DIAGNOSIS — J01.00 ACUTE NON-RECURRENT MAXILLARY SINUSITIS: Primary | ICD-10-CM

## 2024-11-22 DIAGNOSIS — R05.1 ACUTE COUGH: ICD-10-CM

## 2024-11-22 DIAGNOSIS — R06.2 WHEEZE: ICD-10-CM

## 2024-11-22 DIAGNOSIS — J02.9 SORE THROAT: ICD-10-CM

## 2024-11-22 PROCEDURE — 99213 OFFICE O/P EST LOW 20 MIN: CPT | Mod: 25,,, | Performed by: FAMILY MEDICINE

## 2024-11-22 PROCEDURE — 96372 THER/PROPH/DIAG INJ SC/IM: CPT | Mod: ,,, | Performed by: FAMILY MEDICINE

## 2024-11-22 RX ORDER — ALBUTEROL SULFATE 90 UG/1
2 INHALANT RESPIRATORY (INHALATION) EVERY 6 HOURS PRN
Qty: 18 G | Refills: 2 | Status: SHIPPED | OUTPATIENT
Start: 2024-11-22 | End: 2025-11-22

## 2024-11-22 RX ORDER — METHYLPREDNISOLONE ACETATE 40 MG/ML
40 INJECTION, SUSPENSION INTRA-ARTICULAR; INTRALESIONAL; INTRAMUSCULAR; SOFT TISSUE
Status: COMPLETED | OUTPATIENT
Start: 2024-11-22 | End: 2024-11-22

## 2024-11-22 RX ORDER — CEFUROXIME AXETIL 500 MG/1
500 TABLET ORAL EVERY 12 HOURS
Qty: 14 TABLET | Refills: 0 | Status: SHIPPED | OUTPATIENT
Start: 2024-11-22

## 2024-11-22 RX ORDER — DEXAMETHASONE SODIUM PHOSPHATE 4 MG/ML
4 INJECTION, SOLUTION INTRA-ARTICULAR; INTRALESIONAL; INTRAMUSCULAR; INTRAVENOUS; SOFT TISSUE
Status: COMPLETED | OUTPATIENT
Start: 2024-11-22 | End: 2024-11-22

## 2024-11-22 RX ORDER — PREDNISONE 20 MG/1
20 TABLET ORAL 2 TIMES DAILY
Qty: 14 TABLET | Refills: 0 | Status: SHIPPED | OUTPATIENT
Start: 2024-11-22

## 2024-11-22 RX ORDER — CEFTRIAXONE 1 G/1
1 INJECTION, POWDER, FOR SOLUTION INTRAMUSCULAR; INTRAVENOUS
Status: COMPLETED | OUTPATIENT
Start: 2024-11-22 | End: 2024-11-22

## 2024-11-22 RX ADMIN — CEFTRIAXONE 1 G: 1 INJECTION, POWDER, FOR SOLUTION INTRAMUSCULAR; INTRAVENOUS at 09:11

## 2024-11-22 RX ADMIN — METHYLPREDNISOLONE ACETATE 40 MG: 40 INJECTION, SUSPENSION INTRA-ARTICULAR; INTRALESIONAL; INTRAMUSCULAR; SOFT TISSUE at 09:11

## 2024-11-22 RX ADMIN — DEXAMETHASONE SODIUM PHOSPHATE 4 MG: 4 INJECTION, SOLUTION INTRA-ARTICULAR; INTRALESIONAL; INTRAMUSCULAR; INTRAVENOUS; SOFT TISSUE at 09:11

## 2024-11-22 NOTE — PROGRESS NOTES
Venkatesh Bah MD    [address] [place] [address]     PATIENT NAME: Monserrat Lee  : 1985  DATE: 24  MRN: [id number]      Billing Provider: Venkatesh Bah MD  Level of Service: [place] OFFICE/OUTPT VISIT, EST, LEVL III, 20-29 MIN  Patient PCP Information       Provider PCP Type    Sherry Champion, RONALD, FNP General            Reason for Visit / Chief Complaint: Cough (Congestion, cough and sore throat x 3 days. States ears feel stopped up. Denies fever) and Health Maintenance (Denies vaccines today because she is sick. Denies scheduling of pap smear due to no insurance)       Update PCP  Update Chief Complaint         History of Present Illness / Problem Focused Workflow     Monserrat Lee presents to the clinic with Cough (Congestion, cough and sore throat x 3 days. States ears feel stopped up. Denies fever) and Health Maintenance (Denies vaccines today because she is sick. Denies scheduling of pap smear due to no insurance)         HPI    Review of Systems     Review of Systems   Constitutional:  Negative for activity change, appetite change, chills, fatigue and fever.   HENT:  Positive for rhinorrhea and sinus pressure/congestion. Negative for nasal congestion, ear pain, hearing loss, postnasal drip and sore throat.    Respiratory:  Negative for cough, chest tightness, shortness of breath and wheezing.    Cardiovascular:  Negative for chest pain, palpitations, leg swelling and claudication.   Gastrointestinal:  Negative for abdominal pain, change in bowel habit, constipation, diarrhea, nausea and vomiting.   Genitourinary:  Negative for dysuria.   Musculoskeletal:  Negative for arthralgias, back pain, gait problem and myalgias.   Integumentary:  Negative for rash.   Neurological:  Negative for weakness and headaches.   Psychiatric/Behavioral:  Negative for suicidal ideas. The patient is not nervous/anxious.         Medical / Social / Family  History     Past Medical History:   Diagnosis Date    Anxiety disorder     Carpal tunnel syndrome     Cervical radiculopathy     Cervical spondylosis without myelopathy     Chronic pain syndrome     Depressive disorder     Herpes simplex     Insomnia     Low back pain     Lumbosacral radiculopathy     Sacroiliitis, not elsewhere classified        Past Surgical History:   Procedure Laterality Date    FOOT SURGERY Left     Cyst Removal Left Foot    TUBAL LIGATION  2008       Social History  Ms.  reports that she has been smoking cigarettes. She has never used smokeless tobacco. She reports that she does not currently use alcohol. She reports current drug use. Drug: Hydrocodone.    Family History  Ms.'s family history includes Hypertension in her father.    Medications and Allergies     Medications  Outpatient Medications Marked as Taking for the 11/22/24 encounter (Office Visit) with Venkatesh Bah MD   Medication Sig Dispense Refill    amitriptyline (ELAVIL) 50 MG tablet Take 1 tablet (50 mg total) by mouth every evening. 90 tablet 0    buPROPion (WELLBUTRIN XL) 150 MG TB24 tablet Take 150 mg by mouth.      diclofenac (VOLTAREN) 75 MG EC tablet Take 1 tablet (75 mg total) by mouth 2 (two) times daily. 60 tablet 2    diclofenac sodium (VOLTAREN ARTHRITIS PAIN) 1 % Gel Apply 2 g topically 2 (two) times daily. Apply 2 grams BID to knees, elbows, hips joints bilaterally as needed 10 each 2    HYDROcodone-acetaminophen (NORCO) 7.5-325 mg per tablet Take 1 tablet by mouth every 8 (eight) hours. 90 tablet 0    lisdexamfetamine (VYVANSE) 50 MG capsule Take 50 mg by mouth every morning.      mupirocin (BACTROBAN) 2 % ointment Apply a thin layer of ointment on the perineal region, twice daily as needed for INFECTION 30 g 2    pantoprazole (PROTONIX) 40 MG tablet Take 40 mg by mouth.      pregabalin (LYRICA) 150 MG capsule Take 1 capsule (150 mg total) by mouth every 8 (eight) hours. 270 capsule 0    promethazine  (PHENERGAN) 25 MG tablet Take 1 tablet (25 mg total) by mouth every 6 (six) hours as needed for Nausea. 30 tablet 1       Allergies  Review of patient's allergies indicates:  No Known Allergies    Physical Examination     Vitals:    11/22/24 0819   BP: 135/89   Pulse: 85   Resp: 18   Temp: 98.2 °F (36.8 °C)     Physical Exam  Vitals and nursing note reviewed.   Constitutional:       General: She is not in acute distress.     Appearance: Normal appearance. She is not ill-appearing.   HENT:      Head: Normocephalic and atraumatic.      Right Ear: Tympanic membrane, ear canal and external ear normal.      Left Ear: Tympanic membrane, ear canal and external ear normal.      Nose: Congestion and rhinorrhea present.      Right Turbinates: Swollen.      Left Turbinates: Swollen.      Mouth/Throat:      Pharynx: Posterior oropharyngeal erythema present. No pharyngeal swelling or oropharyngeal exudate.   Eyes:      Extraocular Movements: Extraocular movements intact.      Pupils: Pupils are equal, round, and reactive to light.   Cardiovascular:      Rate and Rhythm: Normal rate and regular rhythm.      Heart sounds: Normal heart sounds.   Pulmonary:      Effort: Pulmonary effort is normal.      Breath sounds: Normal breath sounds.   Abdominal:      General: Bowel sounds are normal.      Palpations: Abdomen is soft.   Musculoskeletal:         General: Normal range of motion.   Lymphadenopathy:      Cervical: No cervical adenopathy.   Skin:     General: Skin is warm.      Findings: No rash.   Neurological:      General: No focal deficit present.      Mental Status: She is alert and oriented to person, place, and time. Mental status is at baseline.   Psychiatric:         Mood and Affect: Mood normal.         Behavior: Behavior normal.            Assessment and Plan (including Health Maintenance)      Problem List  Smart Sets  Document Outside HM   :    Plan:     Treated for bacterial infection    Health Maintenance Due   Topic  Date Due    Hepatitis C Screening  Never done    Cervical Cancer Screening  Never done    Lipid Panel  Never done    Pneumococcal Vaccines (Age 0-64) (1 of 2 - PCV) Never done    HIV Screening  Never done    TETANUS VACCINE  Never done    Hemoglobin A1c (Diabetic Prevention Screening)  Never done    Influenza Vaccine (1) Never done    COVID-19 Vaccine (1 - 2024-25 season) Never done       Problem List Items Addressed This Visit    None  Visit Diagnoses       Acute non-recurrent maxillary sinusitis    -  Primary    Relevant Medications    cefTRIAXone injection 1 g (Completed)    dexAMETHasone injection 4 mg (Completed)    methylPREDNISolone acetate injection 40 mg (Completed)    cefUROXime (CEFTIN) 500 MG tablet    Acute cough        Sore throat        Wheeze        Relevant Medications    dexAMETHasone injection 4 mg (Completed)    methylPREDNISolone acetate injection 40 mg (Completed)    predniSONE (DELTASONE) 20 MG tablet    albuterol (VENTOLIN HFA) 90 mcg/actuation inhaler            Health Maintenance Topics with due status: Not Due       Topic Last Completion Date    RSV Vaccine (Age 60+ and Pregnant patients) Not Due       Procedures     Future Appointments   Date Time Provider Department Center   12/11/2024 10:45 AM Ariadne Velarde MD [facility]   1/2/2025 10:30 AM Digna Dietrich MD [facility]        Follow up if symptoms worsen or fail to improve.     Signature:  Venkatesh Bah MD  [address] [place] [address]  Phone #: [phone]  Fax #: [phone]    Date of encounter: 11/22/24    There are no Patient Instructions on file for this visit.

## 2024-12-11 ENCOUNTER — OFFICE VISIT (OUTPATIENT)
Dept: PAIN MEDICINE | Facility: CLINIC | Age: 39
End: 2024-12-11

## 2024-12-11 VITALS
WEIGHT: 193 LBS | RESPIRATION RATE: 18 BRPM | SYSTOLIC BLOOD PRESSURE: 129 MMHG | HEART RATE: 77 BPM | BODY MASS INDEX: 28.58 KG/M2 | HEIGHT: 69 IN | DIASTOLIC BLOOD PRESSURE: 90 MMHG

## 2024-12-11 DIAGNOSIS — M47.817 LUMBOSACRAL SPONDYLOSIS WITHOUT MYELOPATHY: Chronic | ICD-10-CM

## 2024-12-11 DIAGNOSIS — M25.50 POLYARTHRALGIA: Primary | Chronic | ICD-10-CM

## 2024-12-11 DIAGNOSIS — Z79.899 ENCOUNTER FOR LONG-TERM (CURRENT) USE OF MEDICATIONS: ICD-10-CM

## 2024-12-11 DIAGNOSIS — M47.812 CERVICAL SPONDYLOSIS WITHOUT MYELOPATHY: Chronic | ICD-10-CM

## 2024-12-11 PROCEDURE — 80305 DRUG TEST PRSMV DIR OPT OBS: CPT | Mod: PBBFAC | Performed by: PAIN MEDICINE

## 2024-12-11 PROCEDURE — 99999PBSHW POCT URINE DRUG SCREEN PRESUMP: Mod: PBBFAC,,,

## 2024-12-11 PROCEDURE — 99215 OFFICE O/P EST HI 40 MIN: CPT | Mod: PBBFAC | Performed by: PAIN MEDICINE

## 2024-12-11 PROCEDURE — 99999 PR PBB SHADOW E&M-EST. PATIENT-LVL V: CPT | Mod: PBBFAC,,, | Performed by: PAIN MEDICINE

## 2024-12-11 PROCEDURE — 99214 OFFICE O/P EST MOD 30 MIN: CPT | Mod: S$PBB,,, | Performed by: PAIN MEDICINE

## 2024-12-11 RX ORDER — TIZANIDINE 4 MG/1
4 TABLET ORAL EVERY 8 HOURS
Qty: 270 TABLET | Refills: 0 | Status: SHIPPED | OUTPATIENT
Start: 2024-12-11

## 2024-12-11 RX ORDER — AMITRIPTYLINE HYDROCHLORIDE 50 MG/1
50 TABLET, FILM COATED ORAL NIGHTLY
Qty: 90 TABLET | Refills: 0 | Status: SHIPPED | OUTPATIENT
Start: 2024-12-11

## 2024-12-11 RX ORDER — HYDROCODONE BITARTRATE AND ACETAMINOPHEN 7.5; 325 MG/1; MG/1
1 TABLET ORAL EVERY 8 HOURS PRN
Qty: 90 TABLET | Refills: 0 | Status: SHIPPED | OUTPATIENT
Start: 2024-12-24 | End: 2025-01-23

## 2024-12-11 RX ORDER — HYDROCODONE BITARTRATE AND ACETAMINOPHEN 7.5; 325 MG/1; MG/1
1 TABLET ORAL EVERY 8 HOURS PRN
Qty: 90 TABLET | Refills: 0 | Status: SHIPPED | OUTPATIENT
Start: 2025-01-24 | End: 2025-02-23

## 2024-12-11 RX ORDER — HYDROCODONE BITARTRATE AND ACETAMINOPHEN 7.5; 325 MG/1; MG/1
1 TABLET ORAL EVERY 8 HOURS PRN
Qty: 90 TABLET | Refills: 0 | Status: SHIPPED | OUTPATIENT
Start: 2025-02-13 | End: 2025-03-15

## 2024-12-11 RX ORDER — DICLOFENAC SODIUM 75 MG/1
75 TABLET, DELAYED RELEASE ORAL 2 TIMES DAILY
Qty: 60 TABLET | Refills: 2 | Status: SHIPPED | OUTPATIENT
Start: 2024-12-11

## 2024-12-11 RX ORDER — HYDROCODONE BITARTRATE AND ACETAMINOPHEN 7.5; 325 MG/1; MG/1
1 TABLET ORAL EVERY 8 HOURS PRN
Qty: 90 TABLET | Refills: 0 | Status: SHIPPED | OUTPATIENT
Start: 2024-12-11 | End: 2024-12-11 | Stop reason: CLARIF

## 2024-12-11 RX ORDER — PREGABALIN 150 MG/1
150 CAPSULE ORAL EVERY 8 HOURS
Qty: 270 CAPSULE | Refills: 0 | Status: SHIPPED | OUTPATIENT
Start: 2024-12-11

## 2024-12-11 NOTE — PROGRESS NOTES
She Disclaimer: This note has been generated using voice-recognition software. There may be typographical errors that have been missed during proof-reading        Patient ID: Monserrat Lee is a 39 y.o. female.      Chief Complaint: Hip Pain (bilateral) and Low-back Pain      39-year-old female returns for re-evaluation of chronic pain involving both hips, neck, lower back and multiple joints.  She has porphyria but has been unable to obtain treatment with a rheumatologist due to lack of insurance.  Conservative treatment with physical therapy failed to provide relief.  Her medications are providing tolerable pain relief she returns today for medication refill.                    Pain Assessment  Pain Assessment: 0-10  Pain Score:   7  Pain Location: Back  Pain Orientation: Left, Right  Pain Radiating Towards: hips  Pain Descriptors: Aching, Burning, Sharp  Pain Frequency: Intermittent  Pain Onset: Awakened from sleep  Clinical Progression: Not changed  Aggravating Factors: Standing, Walking  Pain Intervention(s): Medication (See eMAR), Home medication, Heat applied      A's of Opioid Risk Assessment  Activity:Patient can perform ADL.   Analgesia:Patients pain is  controlled by current medication.   Adverse Effects: Patient denies constipation or sedation.  Aberrant Behavior:  reviewed with no aberrant drug seeking/taking behavior.      Patient denies any suicidal or homicidal ideations    Physical Therapy/Home Exercise: yes      No image results found.      Review of Systems   Constitutional: Negative.    HENT: Negative.     Eyes: Negative.    Respiratory: Negative.     Cardiovascular: Negative.    Gastrointestinal: Negative.    Endocrine: Negative.    Genitourinary: Negative.    Musculoskeletal:  Positive for arthralgias, back pain, myalgias and neck pain.   Integumentary:  Negative.   Neurological: Negative.    Hematological: Negative.    Psychiatric/Behavioral: Negative.               Past Medical History:    Diagnosis Date    Anxiety disorder     Carpal tunnel syndrome     Cervical radiculopathy     Cervical spondylosis without myelopathy     Chronic pain syndrome     Depressive disorder     Herpes simplex     Insomnia     Low back pain     Lumbosacral radiculopathy     Sacroiliitis, not elsewhere classified      Past Surgical History:   Procedure Laterality Date    FOOT SURGERY Left     Cyst Removal Left Foot    TUBAL LIGATION  2008     Social History     Socioeconomic History    Marital status:    Tobacco Use    Smoking status: Every Day     Current packs/day: 1.00     Types: Cigarettes    Smokeless tobacco: Never   Substance and Sexual Activity    Alcohol use: Not Currently    Drug use: Yes     Types: Hydrocodone     Family History   Problem Relation Name Age of Onset    Hypertension Father       Review of patient's allergies indicates:  No Known Allergies  has a current medication list which includes the following prescription(s): albuterol, bupropion, diclofenac sodium, hydrocodone-acetaminophen, lisdexamfetamine, pantoprazole, amitriptyline, cefuroxime, diclofenac, [START ON 12/24/2024] hydrocodone-acetaminophen, [START ON 1/24/2025] hydrocodone-acetaminophen, [START ON 2/13/2025] hydrocodone-acetaminophen, mupirocin, prednisone, pregabalin, promethazine, and tizanidine.      Objective:  Vitals:    12/11/24 1117   BP: (!) 129/90   Pulse: 77   Resp: 18        Physical Exam  Vitals and nursing note reviewed.   Constitutional:       General: She is not in acute distress.     Appearance: Normal appearance. She is not ill-appearing, toxic-appearing or diaphoretic.   HENT:      Head: Normocephalic and atraumatic.      Nose: Nose normal.      Mouth/Throat:      Mouth: Mucous membranes are moist.   Eyes:      Extraocular Movements: Extraocular movements intact.      Pupils: Pupils are equal, round, and reactive to light.   Cardiovascular:      Rate and Rhythm: Normal rate and regular rhythm.      Heart sounds:  Normal heart sounds.   Pulmonary:      Effort: Pulmonary effort is normal. No respiratory distress.      Breath sounds: Normal breath sounds. No stridor. No wheezing or rhonchi.   Abdominal:      General: Bowel sounds are normal.      Palpations: Abdomen is soft.   Musculoskeletal:         General: No swelling or deformity. Normal range of motion.      Cervical back: Normal and normal range of motion. No spasms or tenderness. No pain with movement. Normal range of motion.      Thoracic back: Normal.      Lumbar back: Tenderness and bony tenderness present. No spasms. Normal range of motion. Negative right straight leg raise test and negative left straight leg raise test. No scoliosis.      Right lower leg: No edema.      Left lower leg: No edema.   Skin:     General: Skin is warm.   Neurological:      General: No focal deficit present.      Mental Status: She is alert and oriented to person, place, and time. Mental status is at baseline.      Cranial Nerves: No cranial nerve deficit.      Sensory: Sensation is intact. No sensory deficit.      Motor: No weakness.      Coordination: Coordination normal.      Gait: Gait normal.      Deep Tendon Reflexes: Reflexes are normal and symmetric.   Psychiatric:         Mood and Affect: Mood normal.         Behavior: Behavior normal.           Assessment:      1. Polyarthralgia    2. Encounter for long-term (current) use of medications    3. Cervical spondylosis without myelopathy    4. Lumbosacral spondylosis without myelopathy          Plan:  1. reviewed  2.Addiction, Dependency, Tolerance, Opioid abuse-misuse, Death, Diversion Discussed. Overdose reversal drug Naloxone discussed. Patient is prescribed opiates for chronic nonmalignant pain pathology.  Patient is receiving opiates which require greater than a 72 hour supply of therapy.  Patient was educated on potential dependency associated with long-term opioid use as well as decreasing efficacy with prolonged use.   Patient was advised of risks, benefits and side effects and how to utilize each medication.  Patient was also informed that any deviation from therapy protocol will  lead to discontinuation of opiates.  It is reasonable to prescribe opioid analgesics for patient based on positive response to opioid medications, lack of side effects and  limited aberrant behavior.    3.Refill/Continue medications for pain control and function       Requested Prescriptions     Signed Prescriptions Disp Refills    amitriptyline (ELAVIL) 50 MG tablet 90 tablet 0     Sig: Take 1 tablet (50 mg total) by mouth every evening.    diclofenac (VOLTAREN) 75 MG EC tablet 60 tablet 2     Sig: Take 1 tablet (75 mg total) by mouth 2 (two) times daily.    tiZANidine (ZANAFLEX) 4 MG tablet 270 tablet 0     Sig: Take 1 tablet (4 mg total) by mouth every 8 (eight) hours.    HYDROcodone-acetaminophen (NORCO) 7.5-325 mg per tablet 90 tablet 0     Sig: Take 1 tablet by mouth every 8 (eight) hours as needed for Pain.    pregabalin (LYRICA) 150 MG capsule 270 capsule 0     Sig: Take 1 capsule (150 mg total) by mouth every 8 (eight) hours.    HYDROcodone-acetaminophen (NORCO) 7.5-325 mg per tablet 90 tablet 0     Sig: Take 1 tablet by mouth every 8 (eight) hours as needed for Pain.    HYDROcodone-acetaminophen (NORCO) 7.5-325 mg per tablet 90 tablet 0     Sig: Take 1 tablet by mouth every 8 (eight) hours as needed for Pain.     4.Patient is unable to afford nerve block injections, physical therapy or surgical consultation due to lack of insurance     5.Follow with BETITO Gentile in 3 months for re-evaluation and medication refill         report:  Reviewed and consistent with medication use as prescribed.      The total time spent for evaluation and management on 12/12/2024 including reviewing separately obtained history, performing a medically appropriate exam and evaluation, documenting clinical information in the health record, independently interpreting  results and communicating them to the patient/family/caregiver, and ordering medications/tests/procedures was between 15-29 minutes.    The above plan and management options were discussed at length with patient. Patient is in agreement with the above and verbalized understanding. It will be communicated with the referring physician via electronic record, fax, or mail.

## 2025-01-03 ENCOUNTER — OFFICE VISIT (OUTPATIENT)
Dept: FAMILY MEDICINE | Facility: CLINIC | Age: 40
End: 2025-01-03

## 2025-01-03 VITALS
SYSTOLIC BLOOD PRESSURE: 140 MMHG | TEMPERATURE: 99 F | HEIGHT: 69 IN | RESPIRATION RATE: 16 BRPM | BODY MASS INDEX: 27.72 KG/M2 | HEART RATE: 88 BPM | WEIGHT: 187.13 LBS | DIASTOLIC BLOOD PRESSURE: 92 MMHG | OXYGEN SATURATION: 99 %

## 2025-01-03 DIAGNOSIS — N89.8 VAGINAL DISCHARGE: ICD-10-CM

## 2025-01-03 DIAGNOSIS — Z72.51 HIGH RISK HETEROSEXUAL BEHAVIOR: Primary | ICD-10-CM

## 2025-01-03 LAB
BACTERIAL VAGINOSIS DNA (OHS): NEGATIVE
CANDIDA GLABRATA/KRUSEI DNA (OHS): NOT DETECTED
CANDIDA SPECIES DNA (OHS): DETECTED
CHLAMYDIA BY PCR: NEGATIVE
HIV 1+O+2 AB SERPL QL: NORMAL
N. GONORRHOEAE (GC) BY PCR: NEGATIVE
SYPHILIS AB INTERPRETATION: NORMAL
TRICHOMONAS NAT: NEGATIVE
TRICHOMONAS VAGINALIS DNA (OHS): NOT DETECTED

## 2025-01-03 PROCEDURE — 87389 HIV-1 AG W/HIV-1&-2 AB AG IA: CPT | Mod: ,,, | Performed by: CLINICAL MEDICAL LABORATORY

## 2025-01-03 PROCEDURE — 87491 CHLMYD TRACH DNA AMP PROBE: CPT | Mod: ,,, | Performed by: CLINICAL MEDICAL LABORATORY

## 2025-01-03 PROCEDURE — 87591 N.GONORRHOEAE DNA AMP PROB: CPT | Mod: ,,, | Performed by: CLINICAL MEDICAL LABORATORY

## 2025-01-03 RX ORDER — FLUCONAZOLE 150 MG/1
150 TABLET ORAL DAILY
Qty: 1 TABLET | Refills: 0 | Status: SHIPPED | OUTPATIENT
Start: 2025-01-03 | End: 2025-01-04

## 2025-01-03 NOTE — LETTER
January 3, 2025      [facility] [address] [place] [address]  Phone: [phone]  Fax: [phone]       Patient: Monserrat Lee   YOB: 1985  Date of Visit: 01/03/2025    To Whom It May Concern:    Gomez Lee  was at [facility] on 01/03/2025. The patient may return to work/school on 01/04/24 with no restrictions. If you have any questions or concerns, or if I can be of further assistance, please do not hesitate to contact me.    Sincerely,    Lety García MA

## 2025-01-04 ENCOUNTER — PATIENT MESSAGE (OUTPATIENT)
Dept: FAMILY MEDICINE | Facility: CLINIC | Age: 40
End: 2025-01-04

## 2025-02-18 ENCOUNTER — OFFICE VISIT (OUTPATIENT)
Dept: FAMILY MEDICINE | Facility: CLINIC | Age: 40
End: 2025-02-18

## 2025-02-18 ENCOUNTER — PATIENT MESSAGE (OUTPATIENT)
Dept: FAMILY MEDICINE | Facility: CLINIC | Age: 40
End: 2025-02-18

## 2025-02-18 ENCOUNTER — HOSPITAL ENCOUNTER (OUTPATIENT)
Dept: RADIOLOGY | Facility: HOSPITAL | Age: 40
Discharge: HOME | End: 2025-02-18
Attending: FAMILY MEDICINE

## 2025-02-18 VITALS
BODY MASS INDEX: 28.79 KG/M2 | DIASTOLIC BLOOD PRESSURE: 92 MMHG | SYSTOLIC BLOOD PRESSURE: 145 MMHG | HEART RATE: 90 BPM | HEIGHT: 69 IN | TEMPERATURE: 98 F | RESPIRATION RATE: 18 BRPM | WEIGHT: 194.38 LBS | OXYGEN SATURATION: 99 %

## 2025-02-18 DIAGNOSIS — M25.522 CHRONIC ELBOW PAIN, LEFT: Chronic | ICD-10-CM

## 2025-02-18 DIAGNOSIS — M15.0 PRIMARY OSTEOARTHRITIS INVOLVING MULTIPLE JOINTS: Chronic | ICD-10-CM

## 2025-02-18 DIAGNOSIS — M06.4 INFLAMMATORY POLYARTHROPATHY: Chronic | ICD-10-CM

## 2025-02-18 DIAGNOSIS — M25.522 CHRONIC ELBOW PAIN, LEFT: Primary | Chronic | ICD-10-CM

## 2025-02-18 DIAGNOSIS — M79.7 FIBROMYALGIA: Chronic | ICD-10-CM

## 2025-02-18 DIAGNOSIS — G89.29 CHRONIC ELBOW PAIN, LEFT: Chronic | ICD-10-CM

## 2025-02-18 DIAGNOSIS — G89.29 CHRONIC ELBOW PAIN, LEFT: Primary | Chronic | ICD-10-CM

## 2025-02-18 LAB
ALBUMIN SERPL BCP-MCNC: 3.9 G/DL (ref 3.5–5)
ALBUMIN/GLOB SERPL: 1.3 {RATIO}
ALP SERPL-CCNC: 93 U/L (ref 40–150)
ALT SERPL W P-5'-P-CCNC: 20 U/L
ANION GAP SERPL CALCULATED.3IONS-SCNC: 15 MMOL/L (ref 7–16)
AST SERPL W P-5'-P-CCNC: 18 U/L (ref 5–34)
BASOPHILS # BLD AUTO: 0.07 K/UL (ref 0–0.2)
BASOPHILS NFR BLD AUTO: 0.7 % (ref 0–1)
BILIRUB SERPL-MCNC: 0.3 MG/DL
BUN SERPL-MCNC: 4 MG/DL (ref 7–19)
BUN/CREAT SERPL: 4 (ref 6–20)
CALCIUM SERPL-MCNC: 10.1 MG/DL (ref 8.4–10.2)
CHLORIDE SERPL-SCNC: 103 MMOL/L (ref 98–107)
CO2 SERPL-SCNC: 26 MMOL/L (ref 22–29)
CREAT SERPL-MCNC: 0.9 MG/DL (ref 0.55–1.02)
CRP SERPL HS-MCNC: 2.17 MG/L
DIFFERENTIAL METHOD BLD: ABNORMAL
EGFR (NO RACE VARIABLE) (RUSH/TITUS): 84 ML/MIN/1.73M2
EOSINOPHIL # BLD AUTO: 0.17 K/UL (ref 0–0.5)
EOSINOPHIL NFR BLD AUTO: 1.6 % (ref 1–4)
ERYTHROCYTE [DISTWIDTH] IN BLOOD BY AUTOMATED COUNT: 13 % (ref 11.5–14.5)
ERYTHROCYTE [SEDIMENTATION RATE] IN BLOOD BY WESTERGREN METHOD: 10 MM/HR (ref 0–20)
GLOBULIN SER-MCNC: 3 G/DL (ref 2–4)
GLUCOSE SERPL-MCNC: 94 MG/DL (ref 74–100)
HCT VFR BLD AUTO: 45.1 % (ref 38–47)
HGB BLD-MCNC: 14.1 G/DL (ref 12–16)
IMM GRANULOCYTES # BLD AUTO: 0.04 K/UL (ref 0–0.04)
IMM GRANULOCYTES NFR BLD: 0.4 % (ref 0–0.4)
LYMPHOCYTES # BLD AUTO: 3.24 K/UL (ref 1–4.8)
LYMPHOCYTES NFR BLD AUTO: 30.6 % (ref 27–41)
MCH RBC QN AUTO: 28.3 PG (ref 27–31)
MCHC RBC AUTO-ENTMCNC: 31.3 G/DL (ref 32–36)
MCV RBC AUTO: 90.6 FL (ref 80–96)
MONOCYTES # BLD AUTO: 0.6 K/UL (ref 0–0.8)
MONOCYTES NFR BLD AUTO: 5.7 % (ref 2–6)
MPC BLD CALC-MCNC: 11.8 FL (ref 9.4–12.4)
NEUTROPHILS # BLD AUTO: 6.48 K/UL (ref 1.8–7.7)
NEUTROPHILS NFR BLD AUTO: 61 % (ref 53–65)
NRBC # BLD AUTO: 0 X10E3/UL
NRBC, AUTO (.00): 0 %
PLATELET # BLD AUTO: 333 K/UL (ref 150–400)
POTASSIUM SERPL-SCNC: 3.9 MMOL/L (ref 3.5–5.1)
PROT SERPL-MCNC: 6.9 G/DL (ref 6.4–8.3)
RBC # BLD AUTO: 4.98 M/UL (ref 4.2–5.4)
SODIUM SERPL-SCNC: 140 MMOL/L (ref 136–145)
WBC # BLD AUTO: 10.6 K/UL (ref 4.5–11)

## 2025-02-18 PROCEDURE — 73070 X-RAY EXAM OF ELBOW: CPT | Mod: TC,PN,LT

## 2025-02-18 RX ORDER — ALPRAZOLAM 2 MG/1
2 TABLET ORAL 3 TIMES DAILY PRN
COMMUNITY
Start: 2025-02-03

## 2025-02-18 NOTE — LETTER
February 20, 2025    Monserrat Lee  [address] [facility] [place] [address]             [facility]  Family Medicine  [address] [facility] [place] [address]  Phone: [phone]  Fax: [phone]   February 20, 2025     Patient: Monserrat Lee   YOB: 1985   Date of Visit: 2/18/2025       To Whom it May Concern:    Monserrat Lee was seen in my clinic on 2/18/2025. Please excuse her from work 2/20/25-2/21/25.    Please excuse her from any work missed.    If you have any questions or concerns, please don't hesitate to call.    Sincerely,       Venkatesh Bah MD

## 2025-02-18 NOTE — PROGRESS NOTES
Venkatesh Bah MD    [address] [place] [address]     PATIENT NAME: Monserrat Lee  : 1985  DATE: 25  MRN: [id number]      Billing Provider: Venkatesh Bah MD  Level of Service: [place] OFFICE/OUTPT VISIT, EST, LEVL IV, 30-39 MIN  Patient PCP Information       Provider PCP Type    Sherry Champion, RONALD, FNP General            Reason for Visit / Chief Complaint: Elbow Problem (Knot on left elbow for about a month. States it is painful) and Health Maintenance (Had a pap about 2 years ago but can not remember where. Denies vaccines at this time)       Update PCP  Update Chief Complaint         History of Present Illness / Problem Focused Workflow     Monserrat Lee presents to the clinic with Elbow Problem (Knot on left elbow for about a month. States it is painful) and Health Maintenance (Had a pap about 2 years ago but can not remember where. Denies vaccines at this time)     She has some neurological symptoms, she was worked up for MS, but told she did not have it, was told she had arthritis and Fibromyalgia     Left eye is blurry and feels like it is twitching     Left elbow pain, and what appears to be a bony protrusion     Itching on her right arm - forearm both sides, if bilateral arms are wrapped in ice, the symptoms go ahead     Also has a diagnosis of fibromyalgia, reports multiple joint pain and a variety of joints the left side seems worse than the right    HPI    Review of Systems     Review of Systems   Constitutional:  Negative for activity change, appetite change, chills, fatigue and fever.   HENT:  Negative for nasal congestion, ear pain, hearing loss, postnasal drip and sore throat.    Respiratory:  Negative for cough, chest tightness, shortness of breath and wheezing.    Cardiovascular:  Negative for chest pain, palpitations, leg swelling and claudication.   Gastrointestinal:  Negative for abdominal pain, change in bowel habit,  constipation, diarrhea, nausea and vomiting.   Genitourinary:  Negative for dysuria.   Musculoskeletal:  Positive for arthralgias. Negative for back pain, gait problem and myalgias.   Integumentary:  Negative for rash.   Neurological:  Negative for weakness and headaches.   Psychiatric/Behavioral:  Negative for suicidal ideas. The patient is not nervous/anxious.         Medical / Social / Family History     Past Medical History:   Diagnosis Date    Anxiety disorder     Carpal tunnel syndrome     Cervical radiculopathy     Cervical spondylosis without myelopathy     Chronic pain syndrome     Depressive disorder     Herpes simplex     Insomnia     Low back pain     Lumbosacral radiculopathy     Sacroiliitis, not elsewhere classified        Past Surgical History:   Procedure Laterality Date    FOOT SURGERY Left     Cyst Removal Left Foot    TUBAL LIGATION  2008       Social History  Ms.  reports that she has been smoking cigarettes. She has never used smokeless tobacco. She reports that she does not currently use alcohol. She reports current drug use. Drug: Hydrocodone.    Family History  Ms.'s family history includes Hypertension in her father.    Medications and Allergies     Medications  Outpatient Medications Marked as Taking for the 2/18/25 encounter (Office Visit) with Venkatesh Bah MD   Medication Sig Dispense Refill    ALPRAZolam (XANAX) 2 MG Tab Take 2 mg by mouth 3 (three) times daily as needed (Takes 1/4-1/2 mg as needed.).      buPROPion (WELLBUTRIN XL) 150 MG TB24 tablet Take 150 mg by mouth.      diclofenac sodium (VOLTAREN ARTHRITIS PAIN) 1 % Gel Apply 2 g topically 2 (two) times daily. Apply 2 grams BID to knees, elbows, hips joints bilaterally as needed 10 each 2    HYDROcodone-acetaminophen (NORCO) 7.5-325 mg per tablet Take 1 tablet by mouth every 8 (eight) hours as needed for Pain. 90 tablet 0    lisdexamfetamine (VYVANSE) 50 MG capsule Take 50 mg by mouth every morning.      pregabalin  (LYRICA) 150 MG capsule Take 1 capsule (150 mg total) by mouth every 8 (eight) hours. 270 capsule 0    [DISCONTINUED] diclofenac (VOLTAREN) 75 MG EC tablet Take 1 tablet (75 mg total) by mouth 2 (two) times daily. 60 tablet 2    [DISCONTINUED] HYDROcodone-acetaminophen (NORCO) 7.5-325 mg per tablet Take 1 tablet by mouth every 8 (eight) hours. 90 tablet 0       Allergies  Review of patient's allergies indicates:  No Known Allergies    Physical Examination     Vitals:    02/18/25 1509   BP: (!) 145/92   Pulse: 90   Resp: 18   Temp: 98.4 °F (36.9 °C)     Physical Exam  Constitutional:       Appearance: Normal appearance.   HENT:      Head: Normocephalic and atraumatic.   Eyes:      Extraocular Movements: Extraocular movements intact.   Cardiovascular:      Rate and Rhythm: Normal rate and regular rhythm.   Pulmonary:      Effort: Pulmonary effort is normal.      Breath sounds: Normal breath sounds.   Musculoskeletal:        Arms:       Comments: Small hard area along the left elbow near the medial epicondyle, small amount of swelling around this hard lesion   Skin:     General: Skin is warm.   Neurological:      Mental Status: She is alert and oriented to person, place, and time. Mental status is at baseline.            Assessment and Plan (including Health Maintenance)      Problem List  Smart Sets  Document Outside HM   :    Plan:    On physical exam the lesion feels bony, x-ray today    Labs for inflammatory arthritis          Health Maintenance Due   Topic Date Due    Hepatitis C Screening  Never done    Cervical Cancer Screening  Never done    Lipid Panel  Never done    TETANUS VACCINE  Never done    Pneumococcal Vaccines (Age 0-49) (1 of 2 - PCV) Never done    Hemoglobin A1c (Diabetic Prevention Screening)  Never done    COVID-19 Vaccine (1 - 2024-25 season) Never done       Problem List Items Addressed This Visit          Immunology/Multi System    Inflammatory polyarthropathy (Chronic)       Orthopedic     Fibromyalgia (Chronic)    Primary osteoarthritis involving multiple joints (Chronic)    Relevant Orders    Sedimentation Rate    TAMY EIA w/ Reflex to dsDNA/KAELYN    Rheumatoid Factor Screen    CRP, High Sensitivity    Comprehensive Metabolic Panel    CBC Auto Differential    Chronic elbow pain, left - Primary (Chronic)    Relevant Orders    X-Ray Elbow 2 Views Left    Sedimentation Rate    TAMY EIA w/ Reflex to dsDNA/KAELYN    Rheumatoid Factor Screen    CRP, High Sensitivity    Comprehensive Metabolic Panel    CBC Auto Differential       Health Maintenance Topics with due status: Not Due       Topic Last Completion Date    RSV Vaccine (Age 60+ and Pregnant patients) Not Due       Procedures     Future Appointments   Date Time Provider Department Center   4/21/2025  9:30 AM Wilmer Anne PA [facility]        Follow up if symptoms worsen or fail to improve.     Signature:  Venkatesh Bah MD  [address] [place] [address]  Phone #: [phone]  Fax #: [phone]    Date of encounter: 2/18/25    There are no Patient Instructions on file for this visit.

## 2025-02-19 LAB — RHEUMATOID FACT SER NEPH-ACNC: NEGATIVE [IU]/ML

## 2025-02-20 ENCOUNTER — RESULTS FOLLOW-UP (OUTPATIENT)
Dept: FAMILY MEDICINE | Facility: CLINIC | Age: 40
End: 2025-02-20

## 2025-02-20 LAB — ANA SER QL: NEGATIVE

## 2025-02-21 ENCOUNTER — TELEPHONE (OUTPATIENT)
Dept: FAMILY MEDICINE | Facility: CLINIC | Age: 40
End: 2025-02-21

## 2025-02-21 DIAGNOSIS — G89.29 CHRONIC ELBOW PAIN, LEFT: Primary | ICD-10-CM

## 2025-02-21 DIAGNOSIS — M25.522 CHRONIC ELBOW PAIN, LEFT: Primary | ICD-10-CM

## 2025-02-21 DIAGNOSIS — M15.0 PRIMARY OSTEOARTHRITIS INVOLVING MULTIPLE JOINTS: ICD-10-CM

## 2025-02-21 NOTE — TELEPHONE ENCOUNTER
Spoke to patient to tell her lab results. Patient states that her left arm has no strength in it and she can't lift anything over a couple pounds with that arm. She is curious if you think it is just arthritis or if she needs to come back in. Please advise

## 2025-02-24 ENCOUNTER — OFFICE VISIT (OUTPATIENT)
Dept: ORTHOPEDICS | Facility: CLINIC | Age: 40
End: 2025-02-24

## 2025-02-24 VITALS
WEIGHT: 194 LBS | BODY MASS INDEX: 28.73 KG/M2 | HEIGHT: 69 IN | DIASTOLIC BLOOD PRESSURE: 86 MMHG | OXYGEN SATURATION: 100 % | TEMPERATURE: 99 F | SYSTOLIC BLOOD PRESSURE: 125 MMHG | HEART RATE: 87 BPM

## 2025-02-24 DIAGNOSIS — M25.522 CHRONIC ELBOW PAIN, LEFT: ICD-10-CM

## 2025-02-24 DIAGNOSIS — M77.12 LATERAL EPICONDYLITIS OF LEFT ELBOW: Primary | ICD-10-CM

## 2025-02-24 DIAGNOSIS — M15.0 PRIMARY OSTEOARTHRITIS INVOLVING MULTIPLE JOINTS: ICD-10-CM

## 2025-02-24 DIAGNOSIS — G89.29 CHRONIC ELBOW PAIN, LEFT: ICD-10-CM

## 2025-02-24 PROCEDURE — 99999PBSHW PR PBB SHADOW TECHNICAL ONLY FILED TO HB: Mod: PBBFAC,,,

## 2025-02-24 PROCEDURE — 20605 DRAIN/INJ JOINT/BURSA W/O US: CPT | Mod: PBBFAC,LT | Performed by: NURSE PRACTITIONER

## 2025-02-24 PROCEDURE — 99999 PR PBB SHADOW E&M-EST. PATIENT-LVL IV: CPT | Mod: PBBFAC,,, | Performed by: NURSE PRACTITIONER

## 2025-02-24 PROCEDURE — 99204 OFFICE O/P NEW MOD 45 MIN: CPT | Mod: S$PBB,25,, | Performed by: NURSE PRACTITIONER

## 2025-02-24 PROCEDURE — 99214 OFFICE O/P EST MOD 30 MIN: CPT | Mod: PBBFAC | Performed by: NURSE PRACTITIONER

## 2025-02-24 RX ORDER — METHYLPREDNISOLONE 4 MG/1
TABLET ORAL
Qty: 21 EACH | Refills: 0 | Status: SHIPPED | OUTPATIENT
Start: 2025-02-24 | End: 2025-03-17

## 2025-02-24 RX ORDER — TRIAMCINOLONE ACETONIDE 40 MG/ML
40 INJECTION, SUSPENSION INTRA-ARTICULAR; INTRAMUSCULAR
Status: DISCONTINUED | OUTPATIENT
Start: 2025-02-24 | End: 2025-02-24 | Stop reason: HOSPADM

## 2025-02-24 RX ADMIN — TRIAMCINOLONE ACETONIDE 40 MG: 40 INJECTION, SUSPENSION INTRA-ARTICULAR; INTRAMUSCULAR at 01:02

## 2025-02-24 NOTE — LETTER
February 24, 2025      [facility]  [address] [place] [id number]  Phone: [phone]  Fax: [phone]       Patient: Monserrat Lee   YOB: 1985  Date of Visit: 02/24/2025    To Whom It May Concern:    Gomez Lee  was at [facility] on 02/24/2025. The patient may return to work/school on 02/25/2025 with no restrictions. If you have any questions or concerns, or if I can be of further assistance, please do not hesitate to contact me.    Sincerely,    BENNY Perrin

## 2025-02-24 NOTE — PROGRESS NOTES
"    HPI:   Monserrat Lee is a pleasant 39 y.o. patient who reports to clinic for evaluation of left elbow pain.     Injury onset and description: Pain has been present for about a month. No specific injury.  Very tender in the lateral side of her elbow.  She is a .  Certain motions of her arm and hand are very painful.  She has taking diclofenac  Patient's occupation:   This is not a work related injury.   This injury has been non-responsive to conservative care. The pain is worse with repetitive use, and strenuous activity is very difficult.    her pain improves with rest.  she rates pain as a  3/10on the Visual Analog Scale.        PAST MEDICAL HISTORY:   Past Medical History:   Diagnosis Date    Anxiety disorder     Carpal tunnel syndrome     Cervical radiculopathy     Cervical spondylosis without myelopathy     Chronic pain syndrome     Depressive disorder     Herpes simplex     Insomnia     Low back pain     Lumbosacral radiculopathy     Sacroiliitis, not elsewhere classified      PAST SURGICAL HISTORY:   Past Surgical History:   Procedure Laterality Date    FOOT SURGERY Left     Cyst Removal Left Foot    TUBAL LIGATION  2008     MEDICATIONS:  Current Medications[1]  ALLERGIES:   Review of patient's allergies indicates:  No Known Allergies      PHYSICAL EXAM:  VITAL SIGNS: /86 (BP Location: Right arm, Patient Position: Sitting)   Pulse 87   Temp 98.5 °F (36.9 °C)   Ht 5' 9" (1.753 m)   Wt 88 kg (194 lb)   SpO2 100%   BMI 28.65 kg/m²   General: Well-developed well-nourished 39 y.o. femalein no acute distress;Cardiovascular: Regular rhythm by palpation of distal pulse, normal color and temperature, no concerning varicosities on symptomatic side Lungs: No labored breathing or wheezing appreciated Neuro: Alert and oriented ×3 Psychiatric: well oriented to person, place and time, demonstrates normal mood and affect Skin: No rashes, lesions or ulcers, normal temperature, turgor, " and texture on uninvolved extremity            Left Elbow Exam     Inspection   Scars: absent  Effusion: absent  Bruising: absent    Pain   The patient exhibits pain of the lateral epicondyle    Tenderness   The patient is tender to palpation of the lateral epicondyle.     Range of Motion   Extension:  normal   Flexion:  normal   Pronation:  normal   Supination:  normal     Tests   Tennis Elbow: moderate        Muscle Strength   Left Upper Extremity  :  4/5     Vascular Exam       Left Pulses      Radial:                    2+      IMAGING:  X-Ray Elbow 2 Views Left  Result Date: 2/19/2025  EXAMINATION: XR ELBOW 2 VIEWS LEFT CLINICAL HISTORY: Pain in left elbow COMPARISON: 07/17/2015 FINDINGS: The alignment is within normal limits.  No displaced fractures identified.  No evidence of lytic or blastic lesions.Joint spaces are unremarkable.Soft tissues are unremarkable.     No evidence of fracture.No significant degenerative changes. Electronically signed by: Donn De Souza MD Date:    02/19/2025 Time:    07:56        ASSESSMENT:      ICD-10-CM ICD-9-CM   1. Chronic elbow pain, left  M25.522 719.42    G89.29 338.29   2. Primary osteoarthritis involving multiple joints  M15.0 715.98       PLAN:     -Findings and treatment options were discussed with the patient  -All questions answered  Left tennis elbow injection today.  Continue diclofenac twice a day.  Medrol Dosepak.  Return to clinic in 6 weeks if no better    There are no Patient Instructions on file for this visit.  No orders of the defined types were placed in this encounter.    L lateral epicondyleIntermediate Joint Aspiration/Injection    Date/Time: 2/24/2025 1:00 PM    Performed by: Bobbi Riley FNP  Authorized by: Bobbi Riley FNP    Indications:  Pain  Site marked: The procedure site was marked      Location:  Elbow  Needle size:  22 G  Medications:  40 mg triamcinolone acetonide 40 mg/mL  Patient tolerance:  Patient tolerated the procedure  well with no immediate complications           [1]   Current Outpatient Medications:     ALPRAZolam (XANAX) 2 MG Tab, Take 2 mg by mouth 3 (three) times daily as needed (Takes 1/4-1/2 mg as needed.)., Disp: , Rfl:     buPROPion (WELLBUTRIN XL) 150 MG TB24 tablet, Take 150 mg by mouth., Disp: , Rfl:     diclofenac sodium (VOLTAREN ARTHRITIS PAIN) 1 % Gel, Apply 2 g topically 2 (two) times daily. Apply 2 grams BID to knees, elbows, hips joints bilaterally as needed, Disp: 10 each, Rfl: 2    HYDROcodone-acetaminophen (NORCO) 7.5-325 mg per tablet, Take 1 tablet by mouth every 8 (eight) hours as needed for Pain., Disp: 90 tablet, Rfl: 0    lisdexamfetamine (VYVANSE) 50 MG capsule, Take 50 mg by mouth every morning., Disp: , Rfl:     pregabalin (LYRICA) 150 MG capsule, Take 1 capsule (150 mg total) by mouth every 8 (eight) hours., Disp: 270 capsule, Rfl: 0

## 2025-03-24 ENCOUNTER — TELEPHONE (OUTPATIENT)
Dept: PAIN MEDICINE | Facility: CLINIC | Age: 40
End: 2025-03-24

## 2025-03-24 DIAGNOSIS — M43.16 SPONDYLOLISTHESIS OF LUMBAR REGION: Primary | ICD-10-CM

## 2025-03-24 RX ORDER — HYDROCODONE BITARTRATE AND ACETAMINOPHEN 7.5; 325 MG/1; MG/1
1 TABLET ORAL EVERY 8 HOURS PRN
Qty: 90 TABLET | Refills: 0 | Status: SHIPPED | OUTPATIENT
Start: 2025-03-26 | End: 2025-03-24

## 2025-03-24 RX ORDER — HYDROCODONE BITARTRATE AND ACETAMINOPHEN 7.5; 325 MG/1; MG/1
1 TABLET ORAL EVERY 8 HOURS PRN
Qty: 90 TABLET | Refills: 0 | Status: SHIPPED | OUTPATIENT
Start: 2025-03-26

## 2025-03-24 NOTE — TELEPHONE ENCOUNTER
Patient had a question about when her next refill date was. I told her it was March 26. Since Feb. Only had 28 days in it. Patient voiced understanding.

## 2025-03-25 ENCOUNTER — OFFICE VISIT (OUTPATIENT)
Dept: OBSTETRICS AND GYNECOLOGY | Facility: CLINIC | Age: 40
End: 2025-03-25

## 2025-03-25 VITALS
HEIGHT: 69 IN | TEMPERATURE: 99 F | BODY MASS INDEX: 28.73 KG/M2 | DIASTOLIC BLOOD PRESSURE: 87 MMHG | RESPIRATION RATE: 18 BRPM | OXYGEN SATURATION: 100 % | SYSTOLIC BLOOD PRESSURE: 138 MMHG | HEART RATE: 88 BPM | WEIGHT: 194 LBS

## 2025-03-25 DIAGNOSIS — E66.3 OVERWEIGHT WITH BODY MASS INDEX (BMI) OF 28 TO 28.9 IN ADULT: ICD-10-CM

## 2025-03-25 DIAGNOSIS — N92.1 MENORRHAGIA WITH IRREGULAR CYCLE: ICD-10-CM

## 2025-03-25 DIAGNOSIS — Z01.419 WOMEN'S ANNUAL ROUTINE GYNECOLOGICAL EXAMINATION: Primary | ICD-10-CM

## 2025-03-25 DIAGNOSIS — Z12.4 ENCOUNTER FOR PAPANICOLAOU SMEAR FOR CERVICAL CANCER SCREENING: ICD-10-CM

## 2025-03-25 DIAGNOSIS — Z11.51 SCREENING FOR HPV (HUMAN PAPILLOMAVIRUS): ICD-10-CM

## 2025-03-25 LAB
EST. AVERAGE GLUCOSE BLD GHB EST-MCNC: 103 MG/DL
HBA1C MFR BLD HPLC: 5.2 %
T4 FREE SERPL-MCNC: 0.83 NG/DL (ref 0.7–1.48)
TSH SERPL DL<=0.005 MIU/L-ACNC: 1.23 UIU/ML (ref 0.35–4.94)

## 2025-03-25 PROCEDURE — 96372 THER/PROPH/DIAG INJ SC/IM: CPT | Mod: ,,, | Performed by: ADVANCED PRACTICE MIDWIFE

## 2025-03-25 PROCEDURE — 84439 ASSAY OF FREE THYROXINE: CPT | Mod: ,,, | Performed by: CLINICAL MEDICAL LABORATORY

## 2025-03-25 PROCEDURE — 84443 ASSAY THYROID STIM HORMONE: CPT | Mod: ,,, | Performed by: CLINICAL MEDICAL LABORATORY

## 2025-03-25 PROCEDURE — 83036 HEMOGLOBIN GLYCOSYLATED A1C: CPT | Mod: ,,, | Performed by: CLINICAL MEDICAL LABORATORY

## 2025-03-25 PROCEDURE — 87626 HPV SEP HI-RSK TYP&POOL RSLT: CPT | Mod: ,,, | Performed by: CLINICAL MEDICAL LABORATORY

## 2025-03-25 PROCEDURE — 99395 PREV VISIT EST AGE 18-39: CPT | Mod: 25,,, | Performed by: ADVANCED PRACTICE MIDWIFE

## 2025-03-25 PROCEDURE — 88142 CYTOPATH C/V THIN LAYER: CPT | Mod: TC,GCY | Performed by: ADVANCED PRACTICE MIDWIFE

## 2025-03-25 RX ORDER — NAPROXEN SODIUM 550 MG/1
550 TABLET ORAL
Qty: 60 TABLET | Refills: 1 | Status: SHIPPED | OUTPATIENT
Start: 2025-03-25 | End: 2026-03-25

## 2025-03-25 RX ORDER — MEDROXYPROGESTERONE ACETATE 150 MG/ML
150 INJECTION, SUSPENSION INTRAMUSCULAR ONCE
Status: COMPLETED | OUTPATIENT
Start: 2025-03-25 | End: 2025-03-25

## 2025-03-25 RX ADMIN — MEDROXYPROGESTERONE ACETATE 150 MG: 150 INJECTION, SUSPENSION INTRAMUSCULAR at 12:03

## 2025-03-25 NOTE — PROGRESS NOTES
Patient ID: Monserrat Lee is a 39 y.o. female     Chief Complaint:   Chief Complaint   Patient presents with    Abdominal Pain     Patient states she is having abdominal pain during her cycle. She states she is having a heavy flow and some blood clots. She states her cycle started on 03/10- off for four days then started back on -.        HPI: Monserrat Lee is a 39 y.o. female who presents as a new patient for well woman exam with Pap. Has c/o heavy menstrual cycles, occasionally has 2 cycles per month. Works part-time/prn as . Desires treatment for heavy cycles as she is sometime not near restrooms. States does not have insurance, currently under [facility] financial assistance program which does not cover prescriptions and cannot afford additional costs.  Discussed management of heavy bleeding with hormonal contraceptive. Agrees to depo injection today, labs, and pelvic US. Recent STD testing negative. Last H&H 14.4/45.1 on 25.   LMP: Patient's last menstrual period was 2024 (exact date).  Sexually active: yes   Contraceptive: BTL  Mammogram: n/a, discussed routine screening starting at age 40, will order and have scheduled after her birthday     Past Medical History:   Diagnosis Date    Anxiety disorder     Carpal tunnel syndrome     Cervical radiculopathy     Cervical spondylosis without myelopathy     Chronic pain syndrome     Depressive disorder     Herpes simplex     Insomnia     Low back pain     Lumbosacral radiculopathy     Sacroiliitis, not elsewhere classified        Past Surgical History:   Procedure Laterality Date    FOOT SURGERY Left     Cyst Removal Left Foot    TUBAL LIGATION         Social History[1]    Family History   Problem Relation Name Age of Onset    Hypertension Father         OB History          2    Para        Term                AB        Living             SAB        IAB        Ectopic        Multiple        Live Births  "  2                 /87 (BP Location: Right arm, Patient Position: Sitting)   Pulse 88   Temp 98.5 °F (36.9 °C) (Oral)   Resp 18   Ht 5' 9" (1.753 m)   Wt 88 kg (194 lb)   LMP 03/20/2024 (Exact Date)   SpO2 100%   BMI 28.65 kg/m²     Wt Readings from Last 3 Encounters:   03/25/25 88 kg (194 lb)   02/24/25 88 kg (194 lb)   02/18/25 88.2 kg (194 lb 6 oz)        Review of Systems   Constitutional: Negative.    Eyes: Negative.    Respiratory: Negative.     Cardiovascular: Negative.    Gastrointestinal: Negative.    Endocrine: Negative.    Genitourinary:  Positive for dysmenorrhea and menorrhagia.   Musculoskeletal: Negative.    Integumentary:  Negative.   Neurological: Negative.    Hematological: Negative.    Psychiatric/Behavioral: Negative.     Breast: negative.         Physical Exam   GENERAL: Well-developed, well-nourished slightly overweight female in no acute distress  NECK: Supple with full range of motion. No adenopathy, masses, or thyromegaly  SKIN: Normal to inspection with no rashes, lesions, or ulcers  LUNGS: Clear bilaterally with no rales, rhonchi, or wheezes   CARDIOVASCULAR AUSCULTATION: Normal heart rate and rhythm  BREASTS: No masses, lumps, discharge, tenderness, or skin changes  LYMPH NODES: No axillary, or inguinal adenopathy  ABDOMEN: Soft, non tender, without masses. No palpable hernia or  hepatosplenomegaly  VULVA: General appearance normal; external genitalia without lesions or rash  URETHRA: Normal size and location with no lesions or prolapse  VAGINA: Mucosa normal, no discharge or lesions. Light bleeding noted  CERVIX: Pink without lesions, discharge or tenderness  UTERUS: Regular, mobile, and tender;  consistency is normal. No evidence of adnexa masses, tenderness, or nodularity  ANUS: No lesions or relaxation.  LOWER EXTREMITIES: No edema or tenderness  PSYCHIATRIC: Patient is oriented to person, place, and time. Mood and affect are normal      Assessment:  Women's annual " routine gynecological examination  -     ThinPrep Pap Test; Future; Expected date: 03/25/2025    Encounter for Papanicolaou smear for cervical cancer screening  -     ThinPrep Pap Test; Future; Expected date: 03/25/2025    Screening for HPV (human papillomavirus)  -     ThinPrep Pap Test; Future; Expected date: 03/25/2025    Menorrhagia with irregular cycle  -     Cancel: US Pelvis Complete Non OB; Future; Expected date: 03/25/2025  -     naproxen sodium (ANAPROX) 550 MG tablet; Take 1 tablet (550 mg total) by mouth 3 (three) times daily with meals. Take 1 tablet every 8 hours during menstrual cycle for up to 5 days. Take with food.  Dispense: 60 tablet; Refill: 1  -     medroxyPROGESTERone (DEPO-PROVERA) syringe 150 mg  -     TSH; Future; Expected date: 03/25/2025  -     T4, Free; Future; Expected date: 03/25/2025  -     Hemoglobin A1C; Future; Expected date: 03/25/2025    Overweight with body mass index (BMI) of 28 to 28.9 in adult          ICD-10-CM ICD-9-CM    1. Women's annual routine gynecological examination  Z01.419 V72.31 ThinPrep Pap Test      ThinPrep Pap Test      2. Encounter for Papanicolaou smear for cervical cancer screening  Z12.4 V76.2 ThinPrep Pap Test      ThinPrep Pap Test      3. Screening for HPV (human papillomavirus)  Z11.51 V73.81 ThinPrep Pap Test      ThinPrep Pap Test      4. Menorrhagia with irregular cycle  N92.1 626.2 naproxen sodium (ANAPROX) 550 MG tablet      medroxyPROGESTERone (DEPO-PROVERA) syringe 150 mg      TSH      T4, Free      Hemoglobin A1C      TSH      T4, Free      Hemoglobin A1C      CANCELED: US Pelvis Complete Non OB      5. Overweight with body mass index (BMI) of 28 to 28.9 in adult  E66.3 278.02     Z68.28 V85.24           Plan:  Annual exam completed, Pap specimen obtained  Blood drawn for labs  Will call or send My Chart message after results reviewed  Patient was counseled today on ASCCP Pap with HPV screening guidelines and recommendations for yearly pelvic  exams   Discussed hormonal contraceptive for management of heavy cycles  Depo 150 mg given IM, discussed r/b and possible s/e, dates given for repeat dosing  Rx sent for Anaprox every 8 hours for pain management and cycle regulation, take with food  Pelvic US ordered    Follow up in about 1 year (around 3/25/2026) for annual gyn exam,   repeat depo 3 months.                         [1]   Social History  Socioeconomic History    Marital status: Single   Tobacco Use    Smoking status: Every Day     Current packs/day: 1.00     Types: Cigarettes    Smokeless tobacco: Never   Substance and Sexual Activity    Alcohol use: Not Currently    Drug use: Yes     Types: Hydrocodone    Sexual activity: Yes     Partners: Male     Birth control/protection: None     Social Drivers of Health     Financial Resource Strain: Medium Risk (1/2/2025)    Overall Financial Resource Strain (CARDIA)     Difficulty of Paying Living Expenses: Somewhat hard   Food Insecurity: Food Insecurity Present (1/2/2025)    Hunger Vital Sign     Worried About Running Out of Food in the Last Year: Sometimes true     Ran Out of Food in the Last Year: Sometimes true   Physical Activity: Insufficiently Active (1/2/2025)    Exercise Vital Sign     Days of Exercise per Week: 1 day     Minutes of Exercise per Session: 10 min   Stress: Stress Concern Present (1/2/2025)    [employer] [place] [employer] - Occupational Stress Questionnaire     Feeling of Stress : To some extent   Housing Stability: High Risk (1/2/2025)    Housing Stability Vital Sign     Unable to Pay for Housing in the Last Year: Yes

## 2025-03-26 ENCOUNTER — HOSPITAL ENCOUNTER (OUTPATIENT)
Dept: RADIOLOGY | Facility: HOSPITAL | Age: 40
Discharge: HOME | End: 2025-03-26
Attending: ADVANCED PRACTICE MIDWIFE

## 2025-03-26 DIAGNOSIS — N92.1 MENORRHAGIA WITH IRREGULAR CYCLE: ICD-10-CM

## 2025-03-26 PROBLEM — E66.3 OVERWEIGHT WITH BODY MASS INDEX (BMI) OF 28 TO 28.9 IN ADULT: Status: ACTIVE | Noted: 2025-03-26

## 2025-03-26 PROCEDURE — 76856 US EXAM PELVIC COMPLETE: CPT | Mod: 26,,, | Performed by: RADIOLOGY

## 2025-03-26 PROCEDURE — 76830 TRANSVAGINAL US NON-OB: CPT | Mod: 26,,, | Performed by: RADIOLOGY

## 2025-03-26 PROCEDURE — 76830 TRANSVAGINAL US NON-OB: CPT | Mod: TC

## 2025-03-27 ENCOUNTER — TELEPHONE (OUTPATIENT)
Facility: CLINIC | Age: 40
End: 2025-03-27

## 2025-03-27 ENCOUNTER — RESULTS FOLLOW-UP (OUTPATIENT)
Facility: CLINIC | Age: 40
End: 2025-03-27

## 2025-03-27 NOTE — TELEPHONE ENCOUNTER
Called to discuss pelvic US report. Name &  verified. Explained small fibroid noted which are benign growths. Can cause irregular or heavy menstrual bleeding. Ovarian cysts are usually benign occurring due to monthly ovulation. Can repeat US in 3-4 months to reevaluate fibroid and left ovarian cyst. Depo injection given during your visit should help manage bleeding. Repeat dose in 3 months. Follow up sooner if needed.

## 2025-03-28 LAB
GH SERPL-MCNC: NORMAL NG/ML
HPV 16: NEGATIVE
HPV 18: POSITIVE
HPV OTHER: NEGATIVE
INSULIN SERPL-ACNC: NORMAL U[IU]/ML
LAB AP CLINICAL INFORMATION: NORMAL
LAB AP GYN INTERPRETATION: NEGATIVE
LAB AP PAP DISCLAIMER COMMENTS: NORMAL
RENIN PLAS-CCNC: NORMAL NG/ML/H

## 2025-04-03 ENCOUNTER — PATIENT MESSAGE (OUTPATIENT)
Dept: OBSTETRICS AND GYNECOLOGY | Facility: CLINIC | Age: 40
End: 2025-04-03

## 2025-04-03 DIAGNOSIS — L73.2 HIDRADENITIS SUPPURATIVA: Primary | ICD-10-CM

## 2025-04-03 RX ORDER — DOXYCYCLINE 100 MG/1
100 CAPSULE ORAL 2 TIMES DAILY
Qty: 14 CAPSULE | Refills: 0 | Status: SHIPPED | OUTPATIENT
Start: 2025-04-03 | End: 2025-04-10

## 2025-04-03 RX ORDER — CLINDAMYCIN PHOSPHATE 10 MG/G
GEL TOPICAL 2 TIMES DAILY
Qty: 60 G | Refills: 1 | Status: SHIPPED | OUTPATIENT
Start: 2025-04-03

## 2025-04-14 NOTE — PROGRESS NOTES
Subjective:         Patient ID: Monserrat Lee is a 39 y.o. female.    Chief Complaint: Hip Pain        Pain  This is a chronic problem. The current episode started more than 1 year ago. The problem occurs daily. The problem has been waxing and waning. Associated symptoms include arthralgias and neck pain. Pertinent negatives include no anorexia, chest pain, chills, coughing, fever, sore throat, vertigo or vomiting.     Review of Systems   Constitutional:  Negative for activity change, appetite change, chills, fever and unexpected weight change.   HENT:  Negative for drooling, ear discharge, ear pain, facial swelling, nosebleeds, sore throat, trouble swallowing, voice change and goiter.    Eyes:  Negative for photophobia, pain, discharge, redness and visual disturbance.   Respiratory:  Negative for apnea, cough, choking, chest tightness, shortness of breath, wheezing and stridor.    Cardiovascular:  Negative for chest pain, palpitations and leg swelling.   Gastrointestinal:  Negative for abdominal distention, anorexia, diarrhea, rectal pain, vomiting and fecal incontinence.   Endocrine: Negative for cold intolerance, heat intolerance, polydipsia, polyphagia and polyuria.   Genitourinary:  Negative for bladder incontinence, dysuria, flank pain, frequency and hot flashes.   Musculoskeletal:  Positive for arthralgias, back pain, leg pain and neck pain.   Integumentary:  Negative for color change and pallor.   Allergic/Immunologic: Negative for immunocompromised state.   Neurological:  Negative for dizziness, vertigo, seizures, syncope, facial asymmetry, speech difficulty, light-headedness, coordination difficulties and memory loss.   Hematological:  Negative for adenopathy. Does not bruise/bleed easily.   Psychiatric/Behavioral:  Negative for agitation, behavioral problems, confusion, decreased concentration, dysphoric mood, hallucinations, self-injury and suicidal ideas. The patient is not nervous/anxious and is not  hyperactive.            Past Medical History:   Diagnosis Date    Anxiety disorder     Carpal tunnel syndrome     Cervical radiculopathy     Cervical spondylosis without myelopathy     Chronic pain syndrome     Depressive disorder     Herpes simplex     Insomnia     Low back pain     Lumbosacral radiculopathy     Sacroiliitis, not elsewhere classified      Past Surgical History:   Procedure Laterality Date    FOOT SURGERY Left     Cyst Removal Left Foot    TUBAL LIGATION  2008     Social History     Socioeconomic History    Marital status: Single   Tobacco Use    Smoking status: Every Day     Current packs/day: 1.00     Types: Cigarettes    Smokeless tobacco: Never   Substance and Sexual Activity    Alcohol use: Not Currently    Drug use: Yes     Types: Hydrocodone    Sexual activity: Yes     Partners: Male     Birth control/protection: None     Social Drivers of Health     Financial Resource Strain: Medium Risk (1/2/2025)    Overall Financial Resource Strain (CARDIA)     Difficulty of Paying Living Expenses: Somewhat hard   Food Insecurity: Food Insecurity Present (1/2/2025)    Hunger Vital Sign     Worried About Running Out of Food in the Last Year: Sometimes true     Ran Out of Food in the Last Year: Sometimes true   Physical Activity: Insufficiently Active (1/2/2025)    Exercise Vital Sign     Days of Exercise per Week: 1 day     Minutes of Exercise per Session: 10 min   Stress: Stress Concern Present (1/2/2025)    [employer] [place] [employer] - Occupational Stress Questionnaire     Feeling of Stress : To some extent   Housing Stability: High Risk (1/2/2025)    Housing Stability Vital Sign     Unable to Pay for Housing in the Last Year: Yes     Family History   Problem Relation Name Age of Onset    Hypertension Father       Review of patient's allergies indicates:  No Known Allergies     Objective:  Vitals:    04/21/25 0931 04/21/25 0932   BP: 138/80    Pulse: 84    Resp: 20    Weight: 88.9 kg (196  "lb)    Height: 5' 9" (1.753 m)    PainSc:   4   4               Physical Exam  Vitals and nursing note reviewed. Exam conducted with a chaperone present.   Constitutional:       General: She is awake. She is not in acute distress.     Appearance: Normal appearance. She is not ill-appearing or diaphoretic.   HENT:      Head: Normocephalic and atraumatic.      Nose: Nose normal.      Mouth/Throat:      Mouth: Mucous membranes are moist.      Pharynx: Oropharynx is clear.   Eyes:      Conjunctiva/sclera: Conjunctivae normal.      Pupils: Pupils are equal, round, and reactive to light.   Cardiovascular:      Rate and Rhythm: Normal rate.   Pulmonary:      Effort: Pulmonary effort is normal. No respiratory distress.   Abdominal:      Palpations: Abdomen is soft.   Musculoskeletal:         General: Normal range of motion.      Right shoulder: Tenderness present.      Left shoulder: Tenderness present.      Right wrist: Tenderness present.      Left wrist: Tenderness present.      Cervical back: Normal range of motion and neck supple. Tenderness present.      Thoracic back: Tenderness present.      Lumbar back: Tenderness present.   Skin:     General: Skin is warm and dry.   Neurological:      General: No focal deficit present.      Mental Status: She is alert and oriented to person, place, and time. Mental status is at baseline.      Cranial Nerves: No cranial nerve deficit (II-XII).   Psychiatric:         Mood and Affect: Mood normal.         Behavior: Behavior normal. Behavior is cooperative.         Thought Content: Thought content normal.           US Pelvis Comp with Transvag NON-OB (xpd  Narrative: EXAMINATION:  US PELVIS COMP WITH TRANSVAG NON-OB (XPD)    CLINICAL HISTORY:  menorrhagia;  Excessive and frequent menstruation with irregular cycle    TECHNIQUE:  Transabdominal and transvaginal imaging was performed.  Transvaginal imaging was required to visualize the details of the uterus and adnexal " regions.    COMPARISON:  None.    FINDINGS:  The uterus measures 8.4 x 4.1 x 6.9 cm.  There is an intramural fibroid measuring 0.56 x 0.6 x 0.52 cm.    The endometrium measures 7.5 mm.  (LMP equals March 22, 2025)    The right ovary measures 3.5 x 1.5 x 1.5 cm.  The left ovary measures 3.6 x 1.8 x 2.6 cm.  It contains a unilocular cyst that measures 2.0 x 0.94 x 2.2 cm.    No fluid is present in the cul-de-sac.  Impression: 1.  There is mild uterine enlargement with a less than 1 cm intramural fibroid.  2.  There is a 2.2 cm left ovarian cyst.    Place of service: [facility]    Electronically signed by: Georgina Mendoza  Date:    03/26/2025  Time:    15:33         Office Visit on 03/25/2025   Component Date Value Ref Range Status    Case Report 03/25/2025    Final                    Value:Pap Cytology                                      Case: [id number]                                   Authorizing Provider:  Georgina Love CNM       Collected:           03/25/2025 12:09 PM          Ordering Location:     [facility] -    Received:            03/25/2025 12:09 PM                                 [place] - OB/GYN                                                        First Screen:          Bertha Velarde CT(ASCP)                                                       Rescreen:              Jeff Granados MD                                                           Specimen:    Liquid-Based Pap Test, Screening, Endocervix                                               Interpretation 03/25/2025 Negative              Final    General Categorization 03/25/2025 Negative for intraepithelial lesion or malignancy   Final    Specimen Adequacy 03/25/2025 Satisfactory for evaluation   Final    Clinical Information 03/25/2025    Final                    Value:unsure of past Pap hx    Disclaimer 03/25/2025    Final                    Value:Notice: An irreducible false negative rate exists with pap smears,  therefore a negative result does not absolutely exclude malignancy.      TSH 03/25/2025 1.225  0.350 - 4.940 uIU/mL Final    Free T4 03/25/2025 0.83  0.70 - 1.48 ng/dL Final    Hemoglobin A1C 03/25/2025 5.2  <=7.0 % Final    Estimated Average Glucose 03/25/2025 103  mg/dL Final    HPV 16 03/25/2025 Negative  Negative, Invalid Final    HPV 18 03/25/2025 Positive (A)  Negative, Invalid Final    HPV Other 03/25/2025 Negative  Negative, Invalid Final   Office Visit on 02/18/2025   Component Date Value Ref Range Status    ESR Westergren 02/18/2025 10  0 - 20 mm/Hr Final    TAMY Screen 02/18/2025 Negative  Negative Final    RA 02/18/2025 Negative  Negative Final    CRP, High Senstivity 02/18/2025 2.17 (H)  <0.50 mg/L Final    Sodium 02/18/2025 140  136 - 145 mmol/L Final    Potassium 02/18/2025 3.9  3.5 - 5.1 mmol/L Final    Chloride 02/18/2025 103  98 - 107 mmol/L Final    CO2 02/18/2025 26  22 - 29 mmol/L Final    Anion Gap 02/18/2025 15  7 - 16 mmol/L Final    Glucose 02/18/2025 94  74 - 100 mg/dL Final    BUN 02/18/2025 4 (L)  7 - 19 mg/dL Final    Creatinine 02/18/2025 0.90  0.55 - 1.02 mg/dL Final    BUN/Creatinine Ratio 02/18/2025 4 (L)  6 - 20 Final    Calcium 02/18/2025 10.1  8.4 - 10.2 mg/dL Final    Total Protein 02/18/2025 6.9  6.4 - 8.3 g/dL Final    Albumin 02/18/2025 3.9  3.5 - 5.0 g/dL Final    Globulin 02/18/2025 3.0  2.0 - 4.0 g/dL Final    A/G Ratio 02/18/2025 1.3   Final    Bilirubin, Total 02/18/2025 0.3  <=1.5 mg/dL Final    Alk Phos 02/18/2025 93  40 - 150 U/L Final    ALT 02/18/2025 20  <=55 U/L Final    AST 02/18/2025 18  5 - 34 U/L Final    eGFR 02/18/2025 84  >=60 mL/min/1.73m2 Final    WBC 02/18/2025 10.60  4.50 - 11.00 K/uL Final    RBC 02/18/2025 4.98  4.20 - 5.40 M/uL Final    Hemoglobin 02/18/2025 14.1  12.0 - 16.0 g/dL Final    Hematocrit 02/18/2025 45.1  38.0 - 47.0 % Final    MCV 02/18/2025 90.6  80.0 - 96.0 fL Final    MCH 02/18/2025 28.3  27.0 - 31.0 pg Final    MCHC 02/18/2025 31.3 (L)   32.0 - 36.0 g/dL Final    RDW 02/18/2025 13.0  11.5 - 14.5 % Final    Platelet Count 02/18/2025 333  150 - 400 K/uL Final    MPV 02/18/2025 11.8  9.4 - 12.4 fL Final    Neutrophils % 02/18/2025 61.0  53.0 - 65.0 % Final    Lymphocytes % 02/18/2025 30.6  27.0 - 41.0 % Final    Monocytes % 02/18/2025 5.7  2.0 - 6.0 % Final    Eosinophils % 02/18/2025 1.6  1.0 - 4.0 % Final    Basophils % 02/18/2025 0.7  0.0 - 1.0 % Final    Immature Granulocytes % 02/18/2025 0.4  0.0 - 0.4 % Final    nRBC, Auto 02/18/2025 0.0  <=0.0 % Final    Neutrophils, Abs 02/18/2025 6.48  1.80 - 7.70 K/uL Final    Lymphocytes, Absolute 02/18/2025 3.24  1.00 - 4.80 K/uL Final    Monocytes, Absolute 02/18/2025 0.60  0.00 - 0.80 K/uL Final    Eosinophils, Absolute 02/18/2025 0.17  0.00 - 0.50 K/uL Final    Basophils, Absolute 02/18/2025 0.07  0.00 - 0.20 K/uL Final    Immature Granulocytes, Absolute 02/18/2025 0.04  0.00 - 0.04 K/uL Final    nRBC, Absolute 02/18/2025 0.00  <=0.00 x10e3/uL Final    Diff Type 02/18/2025 Auto   Final   Office Visit on 01/03/2025   Component Date Value Ref Range Status    Chlamydia by PCR 01/03/2025 Negative  Negative, Invalid Final    N. gonorrhoeae (GC) by PCR 01/03/2025 Negative  Negative, Invalid Final    Trichomonas HYACINTH 01/03/2025 Negative  Negative Final    HIV 1/2 01/03/2025 Non-Reactive  Non-Reactive Final    Syphilis Ab Interpretation 01/03/2025 Non-Reactive  Non-Reactive Final    Bacterial vaginosis DNA 01/03/2025 Negative  Negative Final    Candida species DNA 01/03/2025 Detected (A)  Not Detected Final    Candida glabrata/krusei DNA 01/03/2025 Not Detected  Not Detected Final    Trichomonas vaginalis DNA 01/03/2025 Not Detected  Not Detected Final   Office Visit on 12/11/2024   Component Date Value Ref Range Status    POC Amphetamines 12/11/2024 Negative  Negative, Inconclusive Final    POC Barbiturates 12/11/2024 Negative  Negative, Inconclusive Final    POC Benzodiazepines 12/11/2024 Negative  Negative,  Inconclusive Final    POC Cocaine 12/11/2024 Negative  Negative, Inconclusive Final    POC THC 12/11/2024 Negative  Negative, Inconclusive Final    POC Methadone 12/11/2024 Negative  Negative, Inconclusive Final    POC Methamphetamine 12/11/2024 Negative  Negative, Inconclusive Final    POC Opiates 12/11/2024 Presumptive Positive (A)  Negative, Inconclusive Final    POC Oxycodone 12/11/2024 Negative  Negative, Inconclusive Final    POC Phencyclidine 12/11/2024 Negative  Negative, Inconclusive Final    POC Methylenedioxymethamphetamine * 12/11/2024 Negative  Negative, Inconclusive Final    POC Tricyclic Antidepressants 12/11/2024 Negative  Negative, Inconclusive Final    POC Buprenorphine 12/11/2024 Negative   Final     Acceptable 12/11/2024 Yes   Final    POC Temperature (Urine) 12/11/2024 94   Final         Orders Placed This Encounter   Procedures    POCT Urine Drug Screen Presump     Interpretive Information:     Negative:  No drug detected at the cut off level.   Positive:  This result represents presumptive positive for the   tested drug, other substances may yield a positive response other   than the analyte of interest. This result should be utilized for   diagnostic purpose only. Confirmation testing will be performed upon physician request only.            Requested Prescriptions     Signed Prescriptions Disp Refills    HYDROcodone-acetaminophen (NORCO) 7.5-325 mg per tablet 90 tablet 0     Sig: Take 1 tablet by mouth every 8 (eight) hours as needed for Pain.    HYDROcodone-acetaminophen (NORCO) 7.5-325 mg per tablet 90 tablet 0     Sig: Take 1 tablet by mouth every 8 (eight) hours as needed for Pain.    HYDROcodone-acetaminophen (NORCO) 7.5-325 mg per tablet 90 tablet 0     Sig: Take 1 tablet by mouth every 8 (eight) hours as needed for Pain.    pregabalin (LYRICA) 150 MG capsule 270 capsule 0     Sig: Take 1 capsule (150 mg total) by mouth every 8 (eight) hours.    tiZANidine (ZANAFLEX) 4  MG tablet 270 tablet 0     Sig: Take 1 tablet (4 mg total) by mouth every 8 (eight) hours.       Assessment:     1. Lumbosacral spondylosis without myelopathy    2. Encounter for long-term (current) use of medications    3. Spondylolisthesis of lumbar region    4. Cervical spondylosis without myelopathy    5. Polyarthralgia             A's of Opioid Risk Assessment  Activity:Patient can perform ADL.   Analgesia:Patients pain is partially controlled by current medication. Patient has tried OTC medications such as Tylenol and Ibuprofen with out relief.   Adverse Effects: Patient denies constipation or sedation.  Aberrant Behavior:  reviewed with no aberrant drug seeking/taking behavior.  Overdose reversal drug naloxone discussed    Drug screen reviewed        Plan:    Prefers printed prescription    Narcan June 2024      She states she is now a[facility]    She has no new complaints she states current medications helping control her discomfort    Continue home exercise program as directed    Continue current medication    Follow-up 3 months     Dr. Velarde, December 2025    Bring original prescription medication bottles/container/box with labels to each visit

## 2025-04-21 ENCOUNTER — OFFICE VISIT (OUTPATIENT)
Dept: PAIN MEDICINE | Facility: CLINIC | Age: 40
End: 2025-04-21

## 2025-04-21 VITALS
HEIGHT: 69 IN | SYSTOLIC BLOOD PRESSURE: 138 MMHG | DIASTOLIC BLOOD PRESSURE: 80 MMHG | RESPIRATION RATE: 20 BRPM | HEART RATE: 84 BPM | WEIGHT: 196 LBS | BODY MASS INDEX: 29.03 KG/M2

## 2025-04-21 DIAGNOSIS — M47.817 LUMBOSACRAL SPONDYLOSIS WITHOUT MYELOPATHY: Primary | Chronic | ICD-10-CM

## 2025-04-21 DIAGNOSIS — M47.812 CERVICAL SPONDYLOSIS WITHOUT MYELOPATHY: Chronic | ICD-10-CM

## 2025-04-21 DIAGNOSIS — M43.16 SPONDYLOLISTHESIS OF LUMBAR REGION: Chronic | ICD-10-CM

## 2025-04-21 DIAGNOSIS — Z79.899 ENCOUNTER FOR LONG-TERM (CURRENT) USE OF MEDICATIONS: ICD-10-CM

## 2025-04-21 DIAGNOSIS — M25.50 POLYARTHRALGIA: Chronic | ICD-10-CM

## 2025-04-21 PROCEDURE — 99999 PR PBB SHADOW E&M-EST. PATIENT-LVL IV: CPT | Mod: PBBFAC,,, | Performed by: PHYSICIAN ASSISTANT

## 2025-04-21 PROCEDURE — 80305 DRUG TEST PRSMV DIR OPT OBS: CPT | Mod: PBBFAC | Performed by: PHYSICIAN ASSISTANT

## 2025-04-21 PROCEDURE — 99214 OFFICE O/P EST MOD 30 MIN: CPT | Mod: PBBFAC | Performed by: PHYSICIAN ASSISTANT

## 2025-04-21 PROCEDURE — 99999PBSHW POCT URINE DRUG SCREEN PRESUMP: Mod: PBBFAC,,,

## 2025-04-21 PROCEDURE — 99214 OFFICE O/P EST MOD 30 MIN: CPT | Mod: S$PBB,,, | Performed by: PHYSICIAN ASSISTANT

## 2025-04-21 RX ORDER — TIZANIDINE 4 MG/1
4 TABLET ORAL EVERY 8 HOURS
Qty: 270 TABLET | Refills: 0 | Status: SHIPPED | OUTPATIENT
Start: 2025-04-21

## 2025-04-21 RX ORDER — HYDROCODONE BITARTRATE AND ACETAMINOPHEN 7.5; 325 MG/1; MG/1
1 TABLET ORAL EVERY 8 HOURS PRN
Qty: 90 TABLET | Refills: 0 | Status: SHIPPED | OUTPATIENT
Start: 2025-04-25

## 2025-04-21 RX ORDER — HYDROCODONE BITARTRATE AND ACETAMINOPHEN 7.5; 325 MG/1; MG/1
1 TABLET ORAL EVERY 8 HOURS PRN
Qty: 90 TABLET | Refills: 0 | Status: SHIPPED | OUTPATIENT
Start: 2025-05-25

## 2025-04-21 RX ORDER — PREGABALIN 150 MG/1
150 CAPSULE ORAL EVERY 8 HOURS
Qty: 270 CAPSULE | Refills: 0 | Status: SHIPPED | OUTPATIENT
Start: 2025-04-21

## 2025-04-21 RX ORDER — HYDROCODONE BITARTRATE AND ACETAMINOPHEN 7.5; 325 MG/1; MG/1
1 TABLET ORAL EVERY 8 HOURS PRN
Qty: 90 TABLET | Refills: 0 | Status: SHIPPED | OUTPATIENT
Start: 2025-06-24

## 2025-05-08 ENCOUNTER — PATIENT MESSAGE (OUTPATIENT)
Dept: PAIN MEDICINE | Facility: CLINIC | Age: 40
End: 2025-05-08

## 2025-06-09 ENCOUNTER — OFFICE VISIT (OUTPATIENT)
Dept: ORTHOPEDICS | Facility: CLINIC | Age: 40
End: 2025-06-09

## 2025-06-09 VITALS
SYSTOLIC BLOOD PRESSURE: 131 MMHG | WEIGHT: 191 LBS | HEART RATE: 114 BPM | DIASTOLIC BLOOD PRESSURE: 85 MMHG | OXYGEN SATURATION: 96 % | BODY MASS INDEX: 28.29 KG/M2 | HEIGHT: 69 IN

## 2025-06-09 DIAGNOSIS — M77.12 LATERAL EPICONDYLITIS OF LEFT ELBOW: ICD-10-CM

## 2025-06-09 DIAGNOSIS — M25.522 CHRONIC ELBOW PAIN, LEFT: Primary | ICD-10-CM

## 2025-06-09 DIAGNOSIS — G89.29 CHRONIC ELBOW PAIN, LEFT: Primary | ICD-10-CM

## 2025-06-09 PROCEDURE — 99214 OFFICE O/P EST MOD 30 MIN: CPT | Mod: PBBFAC,25 | Performed by: NURSE PRACTITIONER

## 2025-06-09 PROCEDURE — 99213 OFFICE O/P EST LOW 20 MIN: CPT | Mod: S$PBB,25,, | Performed by: NURSE PRACTITIONER

## 2025-06-09 PROCEDURE — 99999 PR PBB SHADOW E&M-EST. PATIENT-LVL IV: CPT | Mod: PBBFAC,,, | Performed by: NURSE PRACTITIONER

## 2025-06-09 PROCEDURE — 20605 DRAIN/INJ JOINT/BURSA W/O US: CPT | Mod: PBBFAC,LT | Performed by: NURSE PRACTITIONER

## 2025-06-09 PROCEDURE — 99999PBSHW PR PBB SHADOW TECHNICAL ONLY FILED TO HB: Mod: PBBFAC,,,

## 2025-06-09 RX ORDER — NAPROXEN 500 MG/1
500 TABLET ORAL 2 TIMES DAILY WITH MEALS
Qty: 30 TABLET | Refills: 0 | Status: SHIPPED | OUTPATIENT
Start: 2025-06-09

## 2025-06-09 RX ORDER — TRIAMCINOLONE ACETONIDE 40 MG/ML
40 INJECTION, SUSPENSION INTRA-ARTICULAR; INTRAMUSCULAR
Status: DISCONTINUED | OUTPATIENT
Start: 2025-06-09 | End: 2025-06-09 | Stop reason: HOSPADM

## 2025-06-09 RX ADMIN — TRIAMCINOLONE ACETONIDE 40 MG: 40 INJECTION, SUSPENSION INTRA-ARTICULAR; INTRAMUSCULAR at 10:06

## 2025-06-09 NOTE — PROGRESS NOTES
39 y.o. Female returns to clinic for a follow up visit regarding     ICD-10-CM ICD-9-CM   1. Chronic elbow pain, left  M25.522 719.42    G89.29 338.29   2. Lateral epicondylitis of left elbow  M77.12 726.32        Patient was las seen 2/24/2025 and received an injection in her left elbow.   She reports the pain returned about a a couple weeks ago.  Reports the injection helped tremendously but has worn off.  She is currently taking Celebrex.         Past Medical History:   Diagnosis Date    Anxiety disorder     Carpal tunnel syndrome     Cervical radiculopathy     Cervical spondylosis without myelopathy     Chronic pain syndrome     Depressive disorder     Herpes simplex     Insomnia     Low back pain     Lumbosacral radiculopathy     Sacroiliitis, not elsewhere classified      Past Surgical History:   Procedure Laterality Date    FOOT SURGERY Left     Cyst Removal Left Foot    TUBAL LIGATION  2008         PHYSICAL EXAMINATION:            Left Hand/Wrist Exam     Range of Motion     Wrist   Extension:  normal   Flexion:  normal   Abduction: normal  Adduction: normal      Left Elbow Exam     Inspection   Effusion: absent  Bruising: absent    Pain   The patient exhibits pain of the lateral epicondyle    Tenderness   The patient is tender to palpation of the lateral epicondyle.     Range of Motion   Extension:  normal   Flexion:  normal   Pronation:  normal   Supination:  normal     Tests   Tennis Elbow: moderate        Muscle Strength   Left Upper Extremity  :  5/5     Vascular Exam       Left Pulses      Radial:                    2+      IMAGING:  No results found.     ASSESSMENT:      ICD-10-CM ICD-9-CM   1. Chronic elbow pain, left  M25.522 719.42    G89.29 338.29   2. Lateral epicondylitis of left elbow  M77.12 726.32       PLAN:     -Findings and treatment options were discussed with the patient  -All questions answered      Left tennis elbow injection today.  If this injection wears off too  soon we will consider ordering an MRI.  Continue Celebrex.    There are no Patient Instructions on file for this visit.      No orders of the defined types were placed in this encounter.        L lateral epicondyleIntermediate Joint Aspiration/Injection    Date/Time: 6/9/2025 10:40 AM    Performed by: Bobbi Riley FNP  Authorized by: Bobbi Riley FNP    Indications:  Pain  Site marked: The procedure site was marked      Location:  Elbow  Needle size:  22 G  Medications:  40 mg triamcinolone acetonide 40 mg/mL  Patient tolerance:  Patient tolerated the procedure well with no immediate complications

## 2025-06-12 DIAGNOSIS — M25.50 POLYARTHRALGIA: Chronic | ICD-10-CM

## 2025-06-12 DIAGNOSIS — M43.16 SPONDYLOLISTHESIS OF LUMBAR REGION: Chronic | ICD-10-CM

## 2025-06-12 DIAGNOSIS — M47.817 LUMBOSACRAL SPONDYLOSIS WITHOUT MYELOPATHY: Chronic | ICD-10-CM

## 2025-06-12 DIAGNOSIS — M47.812 CERVICAL SPONDYLOSIS WITHOUT MYELOPATHY: Chronic | ICD-10-CM

## 2025-06-12 RX ORDER — CELECOXIB 200 MG/1
200 CAPSULE ORAL
Qty: 30 CAPSULE | Refills: 0 | Status: SHIPPED | OUTPATIENT
Start: 2025-06-12

## 2025-06-24 ENCOUNTER — OFFICE VISIT (OUTPATIENT)
Dept: OBSTETRICS AND GYNECOLOGY | Facility: CLINIC | Age: 40
End: 2025-06-24

## 2025-06-24 VITALS
SYSTOLIC BLOOD PRESSURE: 108 MMHG | WEIGHT: 194.63 LBS | HEART RATE: 103 BPM | BODY MASS INDEX: 28.74 KG/M2 | DIASTOLIC BLOOD PRESSURE: 89 MMHG

## 2025-06-24 DIAGNOSIS — L73.2 HIDRADENITIS SUPPURATIVA: ICD-10-CM

## 2025-06-24 DIAGNOSIS — N92.1 MENORRHAGIA WITH IRREGULAR CYCLE: Primary | ICD-10-CM

## 2025-06-24 DIAGNOSIS — R10.2 PELVIC PAIN: ICD-10-CM

## 2025-06-24 LAB
ANISOCYTOSIS BLD QL SMEAR: NORMAL
BASOPHILS # BLD AUTO: 0.06 K/UL (ref 0–0.2)
BASOPHILS NFR BLD AUTO: 0.4 % (ref 0–1)
DIFFERENTIAL METHOD BLD: ABNORMAL
EOSINOPHIL # BLD AUTO: 0.11 K/UL (ref 0–0.5)
EOSINOPHIL NFR BLD AUTO: 0.8 % (ref 1–4)
ERYTHROCYTE [DISTWIDTH] IN BLOOD BY AUTOMATED COUNT: 13.4 % (ref 11.5–14.5)
FSH SERPL-ACNC: 4.6 MIU/ML
HCT VFR BLD AUTO: 44.8 % (ref 38–47)
HGB BLD-MCNC: 14.7 G/DL (ref 12–16)
IMM GRANULOCYTES # BLD AUTO: 0.04 K/UL (ref 0–0.04)
IMM GRANULOCYTES NFR BLD: 0.3 % (ref 0–0.4)
LH SERPL-ACNC: 1.2 MIU/ML
LYMPHOCYTES # BLD AUTO: 4.17 K/UL (ref 1–4.8)
LYMPHOCYTES NFR BLD AUTO: 29.7 % (ref 27–41)
LYMPHOCYTES NFR BLD MANUAL: 35 % (ref 27–41)
MCH RBC QN AUTO: 28.2 PG (ref 27–31)
MCHC RBC AUTO-ENTMCNC: 32.8 G/DL (ref 32–36)
MCV RBC AUTO: 85.8 FL (ref 80–96)
MONOCYTES # BLD AUTO: 0.79 K/UL (ref 0–0.8)
MONOCYTES NFR BLD AUTO: 5.6 % (ref 2–6)
MONOCYTES NFR BLD MANUAL: 5 % (ref 2–6)
MPC BLD CALC-MCNC: 12.2 FL (ref 9.4–12.4)
NEUTROPHILS # BLD AUTO: 8.88 K/UL (ref 1.8–7.7)
NEUTROPHILS NFR BLD AUTO: 63.2 % (ref 53–65)
NEUTS SEG NFR BLD MANUAL: 60 % (ref 50–62)
NRBC # BLD AUTO: 0 X10E3/UL
NRBC, AUTO (.00): 0 %
OVALOCYTES BLD QL SMEAR: NORMAL
PLATELET # BLD AUTO: 337 K/UL (ref 150–400)
PLATELET MORPHOLOGY: NORMAL
POLYCHROMASIA BLD QL SMEAR: NORMAL
RBC # BLD AUTO: 5.22 M/UL (ref 4.2–5.4)
T4 FREE SERPL-MCNC: 1.03 NG/DL (ref 0.7–1.48)
TESTOST SERPL-MCNC: <13 NG/DL (ref 13.8–15.4)
TSH SERPL DL<=0.005 MIU/L-ACNC: 2.46 UIU/ML (ref 0.35–4.94)
WBC # BLD AUTO: 14.05 K/UL (ref 4.5–11)

## 2025-06-24 PROCEDURE — 99214 OFFICE O/P EST MOD 30 MIN: CPT | Mod: ,,, | Performed by: OBSTETRICS & GYNECOLOGY

## 2025-06-24 PROCEDURE — 84403 ASSAY OF TOTAL TESTOSTERONE: CPT | Mod: ,,, | Performed by: CLINICAL MEDICAL LABORATORY

## 2025-06-24 PROCEDURE — 36415 COLL VENOUS BLD VENIPUNCTURE: CPT | Mod: ,,, | Performed by: OBSTETRICS & GYNECOLOGY

## 2025-06-24 PROCEDURE — 84443 ASSAY THYROID STIM HORMONE: CPT | Mod: ,,, | Performed by: CLINICAL MEDICAL LABORATORY

## 2025-06-24 PROCEDURE — 85025 COMPLETE CBC W/AUTO DIFF WBC: CPT | Mod: ,,, | Performed by: CLINICAL MEDICAL LABORATORY

## 2025-06-24 PROCEDURE — 83002 ASSAY OF GONADOTROPIN (LH): CPT | Mod: ,,, | Performed by: CLINICAL MEDICAL LABORATORY

## 2025-06-24 PROCEDURE — 84439 ASSAY OF FREE THYROXINE: CPT | Mod: ,,, | Performed by: CLINICAL MEDICAL LABORATORY

## 2025-06-24 PROCEDURE — 83001 ASSAY OF GONADOTROPIN (FSH): CPT | Mod: ,,, | Performed by: CLINICAL MEDICAL LABORATORY

## 2025-06-24 NOTE — PROGRESS NOTES
History & Physical    SUBJECTIVE:     History of Present Illness:  Patient is a 39 y.o. female presents with heavy and irregular menses with bleeding in between her cycles. Pt states that she was started Depo Provera in March 2025 secondary to heavy cycles. Pt states that her bleeding has not improved since starting Depo. Her symptoms have been on-going for over a year.   Chief Complaint   Patient presents with    Consult     Pt having long  and heavy period wants to consult with what she can do to loose weight states depo is causing her to eat more and she has struggled with her weight  in the past       Review of patient's allergies indicates:  No Known Allergies    Current Medications[1]    Past Medical History:   Diagnosis Date    Anxiety disorder     Carpal tunnel syndrome     Cervical radiculopathy     Cervical spondylosis without myelopathy     Chronic pain syndrome     Depressive disorder     Herpes simplex     Insomnia     Low back pain     Lumbosacral radiculopathy     Sacroiliitis, not elsewhere classified      Past Surgical History:   Procedure Laterality Date    FOOT SURGERY Left     Cyst Removal Left Foot    TUBAL LIGATION  2008     Family History   Problem Relation Name Age of Onset    Hypertension Father       Social History[2]     Review of Systems:  Review of Systems   Constitutional:  Negative for appetite change, chills, fatigue and fever.   HENT: Negative.     Eyes: Negative.    Respiratory:  Negative for cough, chest tightness and shortness of breath.    Cardiovascular:  Negative for chest pain, palpitations and leg swelling.   Gastrointestinal:  Negative for abdominal distention, abdominal pain, blood in stool, constipation, diarrhea, nausea and vomiting.   Endocrine: Negative for cold intolerance, heat intolerance, polydipsia, polyphagia and polyuria.   Genitourinary:  Positive for menstrual problem (menorrhagia) and pelvic pain. Negative for difficulty urinating, dyspareunia, dysuria, flank  pain, frequency, urgency, vaginal bleeding, vaginal discharge and vaginal pain.   Musculoskeletal: Negative.    Skin: Negative.    Neurological: Negative.    Psychiatric/Behavioral: Negative.  Negative for agitation, behavioral problems, confusion and sleep disturbance. The patient is not nervous/anxious.        OBJECTIVE:     Vital Signs (Most Recent)  Pulse: 103 (06/24/25 1333)  BP: 108/89 (06/24/25 1333)     88.3 kg (194 lb 9.6 oz)     Physical Exam:  Physical Exam  Vitals reviewed. Exam conducted with a chaperone present.   Constitutional:       Appearance: Normal appearance.   HENT:      Head: Normocephalic and atraumatic.      Mouth/Throat:      Mouth: Mucous membranes are moist.   Eyes:      Extraocular Movements: Extraocular movements intact.      Pupils: Pupils are equal, round, and reactive to light.   Cardiovascular:      Rate and Rhythm: Normal rate and regular rhythm.      Pulses: Normal pulses.      Heart sounds: Normal heart sounds.   Pulmonary:      Effort: Pulmonary effort is normal.      Breath sounds: Normal breath sounds.   Abdominal:      General: Abdomen is flat. Bowel sounds are normal.      Palpations: Abdomen is soft.   Musculoskeletal:         General: Normal range of motion.      Cervical back: Normal range of motion.   Skin:     General: Skin is warm and dry.   Neurological:      General: No focal deficit present.      Mental Status: She is alert and oriented to person, place, and time.   Psychiatric:         Mood and Affect: Mood normal.         Behavior: Behavior normal.         Thought Content: Thought content normal.         Judgment: Judgment normal.           ASSESSMENT/PLAN:   Monserrat was seen today for consult.    Diagnoses and all orders for this visit:    Menorrhagia with irregular cycle  -     Thyroid Panel; Future  -     Luteinizing Hormone; Future  -     Follicle Stimulating Hormone; Future  -     CBC Auto Differential; Future  -     Testosterone,Total; Future  -      Hysteroscopy-Future; Future  -     Endometrial biopsy; Future  -     Colposcopy W/BIOPSY AND ECC- Future; Future  -     Thyroid Panel  -     Luteinizing Hormone  -     Follicle Stimulating Hormone  -     CBC Auto Differential  -     Testosterone,Total    Pelvic pain  -     Thyroid Panel; Future  -     Luteinizing Hormone; Future  -     Follicle Stimulating Hormone; Future  -     CBC Auto Differential; Future  -     Testosterone,Total; Future  -     Hysteroscopy-Future; Future  -     Endometrial biopsy; Future  -     Colposcopy W/BIOPSY AND ECC- Future; Future  -     Thyroid Panel  -     Luteinizing Hormone  -     Follicle Stimulating Hormone  -     CBC Auto Differential  -     Testosterone,Total    Hidradenitis suppurativa      There are no hospital problems to display for this patient.      PLAN:Plan      See above           [1]   Current Outpatient Medications   Medication Sig Dispense Refill    buPROPion (WELLBUTRIN XL) 150 MG TB24 tablet Take 150 mg by mouth.      celecoxib (CELEBREX) 200 MG capsule Take 1 capsule by mouth once daily 30 capsule 0    clindamycin phosphate 1% 1 % gel Apply topically 2 (two) times daily. 60 g 1    cyclobenzaprine (FLEXERIL) 10 MG tablet Take 1 tablet (10 mg total) by mouth 3 (three) times daily as needed for Muscle spasms. 90 tablet 2    diclofenac sodium (VOLTAREN ARTHRITIS PAIN) 1 % Gel Apply 2 g topically 2 (two) times daily. Apply 2 grams BID to knees, elbows, hips joints bilaterally as needed 10 each 2    HYDROcodone-acetaminophen (NORCO) 7.5-325 mg per tablet Take 1 tablet by mouth every 8 (eight) hours as needed for Pain. 90 tablet 0    HYDROcodone-acetaminophen (NORCO) 7.5-325 mg per tablet Take 1 tablet by mouth every 8 (eight) hours as needed for Pain. 90 tablet 0    HYDROcodone-acetaminophen (NORCO) 7.5-325 mg per tablet Take 1 tablet by mouth every 8 (eight) hours as needed for Pain. 90 tablet 0    lisdexamfetamine (VYVANSE) 50 MG capsule Take 50 mg by mouth every  morning.      naproxen (NAPROSYN) 500 MG tablet Take 1 tablet (500 mg total) by mouth 2 (two) times daily with a meal 30 tablet 0    pregabalin (LYRICA) 150 MG capsule Take 1 capsule (150 mg total) by mouth every 8 (eight) hours. 270 capsule 0    tiZANidine (ZANAFLEX) 4 MG tablet Take 1 tablet (4 mg total) by mouth every 8 (eight) hours. 270 tablet 0     No current facility-administered medications for this visit.   [2]   Social History  Tobacco Use    Smoking status: Every Day     Current packs/day: 1.00     Types: Cigarettes    Smokeless tobacco: Never   Substance Use Topics    Alcohol use: Not Currently    Drug use: Yes     Types: Hydrocodone

## 2025-06-30 ENCOUNTER — PATIENT MESSAGE (OUTPATIENT)
Dept: OBSTETRICS AND GYNECOLOGY | Facility: CLINIC | Age: 40
End: 2025-06-30

## 2025-07-07 ENCOUNTER — PROCEDURE VISIT (OUTPATIENT)
Dept: OBSTETRICS AND GYNECOLOGY | Facility: CLINIC | Age: 40
End: 2025-07-07

## 2025-07-07 VITALS
HEART RATE: 102 BPM | BODY MASS INDEX: 29.09 KG/M2 | DIASTOLIC BLOOD PRESSURE: 74 MMHG | SYSTOLIC BLOOD PRESSURE: 143 MMHG | WEIGHT: 197 LBS

## 2025-07-07 DIAGNOSIS — B97.7 HPV IN FEMALE: ICD-10-CM

## 2025-07-07 DIAGNOSIS — N72 HIGH RISK HUMAN PAPILLOMA VIRUS (HPV) INFECTION OF CERVIX: ICD-10-CM

## 2025-07-07 DIAGNOSIS — B97.7 HIGH RISK HUMAN PAPILLOMA VIRUS (HPV) INFECTION OF CERVIX: ICD-10-CM

## 2025-07-07 DIAGNOSIS — N92.1 MENORRHAGIA WITH IRREGULAR CYCLE: Primary | ICD-10-CM

## 2025-07-07 DIAGNOSIS — R10.2 PELVIC PAIN: ICD-10-CM

## 2025-07-07 LAB
B-HCG UR QL: NEGATIVE
CTP QC/QA: YES

## 2025-07-07 PROCEDURE — 88305 TISSUE EXAM BY PATHOLOGIST: CPT | Mod: 26,,, | Performed by: PATHOLOGY

## 2025-07-07 PROCEDURE — 81025 URINE PREGNANCY TEST: CPT | Mod: QW,,, | Performed by: OBSTETRICS & GYNECOLOGY

## 2025-07-07 PROCEDURE — 58558 HYSTEROSCOPY BIOPSY: CPT | Mod: ,,, | Performed by: OBSTETRICS & GYNECOLOGY

## 2025-07-07 PROCEDURE — 99499 UNLISTED E&M SERVICE: CPT | Mod: ,,, | Performed by: OBSTETRICS & GYNECOLOGY

## 2025-07-07 PROCEDURE — 57454 BX/CURETT OF CERVIX W/SCOPE: CPT | Mod: 51,,, | Performed by: OBSTETRICS & GYNECOLOGY

## 2025-07-07 PROCEDURE — 88305 TISSUE EXAM BY PATHOLOGIST: CPT | Mod: TC,SUR | Performed by: OBSTETRICS & GYNECOLOGY

## 2025-07-07 PROCEDURE — 88342 IMHCHEM/IMCYTCHM 1ST ANTB: CPT | Mod: 26,,, | Performed by: PATHOLOGY

## 2025-07-07 NOTE — PROCEDURES
Hysteorscopy, EMB Procedure Note:    Following informed written consent, the pt was placed in the lithotomy position. The speculum was inserted into the vagina and the cervix visualized with ease. The cervix was cleaned with betadine.  The uterus was sounded to 9 cm with the uterine sound. The hysteroscope was then inserted into the cervical os and the uterine cavity was directly visualized, bilateral ostea were noted at that time. Findings no lesions, polyps or fibroids seen. The hysteroscope was then removed and a biopsy of the endometrium performed with a pipelle. Specimen sent to pathology for evaluation. The single tooth tenaculum was then removed and the cervix was made hemostatic with pressure.

## 2025-07-07 NOTE — PROCEDURES
Colposcopy    Date/Time: 7/7/2025 12:45 PM    Performed by: Flores Reddy MD  Authorized by: Flores Reddy MD    Consent obatined:  Prior to procedure the appropriate consent was completed and verified  Timeout:Immediately prior to procedure a time out was called to verify the correct patient, procedure, equipment, support staff and site/side marked as required  Prep:Patient was prepped and draped in the usual sterile fashion  Assistants?: Yes    List of assistants:  Carlee Tay I was present for the entire procedure.    Colposcopy Site:  Cervix  Position:  Supine   Patient was prepped and draped in the normal sterile fashion.  Acrowhite Lesion? Yes    Atypical Vessels: No    Transformation Zone Adequate?: Yes    Biopsy?: Yes         Location:  Cervix ((3 00, 6 00, 9 00 and 12 00))  ECC Performed?: Yes    LEEP Performed?: No    Estimated blood loss (cc):  1   Patient tolerated the procedure well with no immediate complications.   Post-operative instructions were provided for the patient.   Patient was discharged and will follow up if any complications occur

## 2025-07-08 LAB
DHEA SERPL-MCNC: NORMAL
DHEA SERPL-MCNC: NORMAL
ESTROGEN SERPL-MCNC: NORMAL PG/ML
ESTROGEN SERPL-MCNC: NORMAL PG/ML
INSULIN SERPL-ACNC: NORMAL U[IU]/ML
INSULIN SERPL-ACNC: NORMAL U[IU]/ML
LAB AP CLINICAL INFORMATION: NORMAL
LAB AP CLINICAL INFORMATION: NORMAL
LAB AP GROSS DESCRIPTION: NORMAL
LAB AP GROSS DESCRIPTION: NORMAL
LAB AP LABORATORY NOTES: NORMAL
LAB AP LABORATORY NOTES: NORMAL
T3RU NFR SERPL: NORMAL %
T3RU NFR SERPL: NORMAL %

## 2025-07-10 ENCOUNTER — PATIENT MESSAGE (OUTPATIENT)
Dept: OBSTETRICS AND GYNECOLOGY | Facility: CLINIC | Age: 40
End: 2025-07-10

## 2025-07-11 DIAGNOSIS — M25.50 POLYARTHRALGIA: Chronic | ICD-10-CM

## 2025-07-11 DIAGNOSIS — M47.817 LUMBOSACRAL SPONDYLOSIS WITHOUT MYELOPATHY: Chronic | ICD-10-CM

## 2025-07-11 DIAGNOSIS — M47.812 CERVICAL SPONDYLOSIS WITHOUT MYELOPATHY: Chronic | ICD-10-CM

## 2025-07-11 DIAGNOSIS — M43.16 SPONDYLOLISTHESIS OF LUMBAR REGION: Chronic | ICD-10-CM

## 2025-07-15 DIAGNOSIS — M25.50 POLYARTHRALGIA: Chronic | ICD-10-CM

## 2025-07-15 DIAGNOSIS — M47.817 LUMBOSACRAL SPONDYLOSIS WITHOUT MYELOPATHY: Chronic | ICD-10-CM

## 2025-07-15 DIAGNOSIS — M43.16 SPONDYLOLISTHESIS OF LUMBAR REGION: Chronic | ICD-10-CM

## 2025-07-15 DIAGNOSIS — M47.812 CERVICAL SPONDYLOSIS WITHOUT MYELOPATHY: Chronic | ICD-10-CM

## 2025-07-15 RX ORDER — CELECOXIB 200 MG/1
200 CAPSULE ORAL
Qty: 30 CAPSULE | Refills: 0 | OUTPATIENT
Start: 2025-07-15

## 2025-07-15 RX ORDER — CELECOXIB 200 MG/1
200 CAPSULE ORAL DAILY
Qty: 30 CAPSULE | Refills: 0 | Status: SHIPPED | OUTPATIENT
Start: 2025-07-15

## 2025-07-15 RX ORDER — CELECOXIB 200 MG/1
200 CAPSULE ORAL DAILY
Qty: 30 CAPSULE | Refills: 0 | Status: CANCELLED | OUTPATIENT
Start: 2025-07-15

## 2025-07-15 NOTE — PROGRESS NOTES
Subjective:         Patient ID: Monserrat Lee is a 39 y.o. female.    Chief Complaint: generalized pain and Hip Pain (left)        Pain  This is a chronic problem. The current episode started more than 1 year ago. The problem occurs daily. The problem has been unchanged. Associated symptoms include arthralgias and neck pain. Pertinent negatives include no anorexia, chest pain, chills, coughing, fever, sore throat, vertigo or vomiting.     Review of Systems   Constitutional:  Negative for activity change, appetite change, chills, fever and unexpected weight change.   HENT:  Negative for drooling, ear discharge, ear pain, facial swelling, nosebleeds, sore throat, trouble swallowing, voice change and goiter.    Eyes:  Negative for photophobia, pain, discharge, redness and visual disturbance.   Respiratory:  Negative for apnea, cough, choking, chest tightness, shortness of breath, wheezing and stridor.    Cardiovascular:  Negative for chest pain, palpitations and leg swelling.   Gastrointestinal:  Negative for abdominal distention, anorexia, diarrhea, rectal pain, vomiting and fecal incontinence.   Endocrine: Negative for cold intolerance, heat intolerance, polydipsia, polyphagia and polyuria.   Genitourinary:  Negative for bladder incontinence, dysuria, flank pain, frequency and hot flashes.   Musculoskeletal:  Positive for arthralgias, back pain, leg pain and neck pain.   Integumentary:  Negative for color change and pallor.   Allergic/Immunologic: Negative for immunocompromised state.   Neurological:  Negative for dizziness, vertigo, seizures, syncope, facial asymmetry, speech difficulty, light-headedness, coordination difficulties and memory loss.   Hematological:  Negative for adenopathy. Does not bruise/bleed easily.   Psychiatric/Behavioral:  Negative for agitation, behavioral problems, confusion, decreased concentration, dysphoric mood, hallucinations, self-injury and suicidal ideas. The patient is not  nervous/anxious and is not hyperactive.            Past Medical History:   Diagnosis Date    Anxiety disorder     Carpal tunnel syndrome     Cervical radiculopathy     Cervical spondylosis without myelopathy     Chronic pain syndrome     Depressive disorder     Herpes simplex     Insomnia     Low back pain     Lumbosacral radiculopathy     Sacroiliitis, not elsewhere classified      Past Surgical History:   Procedure Laterality Date    FOOT SURGERY Left     Cyst Removal Left Foot    TUBAL LIGATION  2008     Social History     Socioeconomic History    Marital status: Single   Tobacco Use    Smoking status: Every Day     Current packs/day: 1.00     Types: Cigarettes    Smokeless tobacco: Never   Substance and Sexual Activity    Alcohol use: Not Currently    Drug use: Yes     Types: Hydrocodone    Sexual activity: Yes     Partners: Male     Birth control/protection: None     Social Drivers of Health     Financial Resource Strain: Medium Risk (1/2/2025)    Overall Financial Resource Strain (CARDIA)     Difficulty of Paying Living Expenses: Somewhat hard   Food Insecurity: Food Insecurity Present (1/2/2025)    Hunger Vital Sign     Worried About Running Out of Food in the Last Year: Sometimes true     Ran Out of Food in the Last Year: Sometimes true   Physical Activity: Insufficiently Active (1/2/2025)    Exercise Vital Sign     Days of Exercise per Week: 1 day     Minutes of Exercise per Session: 10 min   Stress: Stress Concern Present (1/2/2025)    [employer] - Occupational Stress Questionnaire     Feeling of Stress : To some extent   Housing Stability: High Risk (1/2/2025)    Housing Stability Vital Sign     Unable to Pay for Housing in the Last Year: Yes     Family History   Problem Relation Name Age of Onset    Hypertension Father       Review of patient's allergies indicates:  No Known Allergies     Objective:  Vitals:    07/21/25 1048 07/21/25 1050   BP: (!) 143/87    Pulse: 94   "  Resp: 20    Weight: 90.3 kg (199 lb)    Height: 5' 9" (1.753 m)    PainSc:   3   3                 Physical Exam  Vitals and nursing note reviewed. Exam conducted with a chaperone present.   Constitutional:       General: She is awake. She is not in acute distress.     Appearance: Normal appearance. She is not ill-appearing or diaphoretic.   HENT:      Head: Normocephalic and atraumatic.      Nose: Nose normal.      Mouth/Throat:      Mouth: Mucous membranes are moist.      Pharynx: Oropharynx is clear.   Eyes:      Conjunctiva/sclera: Conjunctivae normal.      Pupils: Pupils are equal, round, and reactive to light.   Cardiovascular:      Rate and Rhythm: Normal rate.   Pulmonary:      Effort: Pulmonary effort is normal. No respiratory distress.   Abdominal:      Palpations: Abdomen is soft.   Musculoskeletal:         General: Normal range of motion.      Right shoulder: Tenderness present.      Left shoulder: Tenderness present.      Right wrist: Tenderness present.      Left wrist: Tenderness present.      Cervical back: Normal range of motion and neck supple. Tenderness present.      Thoracic back: Tenderness present.      Lumbar back: Tenderness present.   Skin:     General: Skin is warm and dry.   Neurological:      General: No focal deficit present.      Mental Status: She is alert and oriented to person, place, and time. Mental status is at baseline.      Cranial Nerves: No cranial nerve deficit (II-XII).   Psychiatric:         Mood and Affect: Mood normal.         Behavior: Behavior normal. Behavior is cooperative.         Thought Content: Thought content normal.           US Pelvis Comp with Transvag NON-OB (xpd  Narrative: EXAMINATION:  US PELVIS COMP WITH TRANSVAG NON-OB (XPD)    CLINICAL HISTORY:  menorrhagia;  Excessive and frequent menstruation with irregular cycle    TECHNIQUE:  Transabdominal and transvaginal imaging was performed.  Transvaginal imaging was required to visualize the details of the " uterus and adnexal regions.    COMPARISON:  None.    FINDINGS:  The uterus measures 8.4 x 4.1 x 6.9 cm.  There is an intramural fibroid measuring 0.56 x 0.6 x 0.52 cm.    The endometrium measures 7.5 mm.  (LMP equals March 22, 2025)    The right ovary measures 3.5 x 1.5 x 1.5 cm.  The left ovary measures 3.6 x 1.8 x 2.6 cm.  It contains a unilocular cyst that measures 2.0 x 0.94 x 2.2 cm.    No fluid is present in the cul-de-sac.  Impression: 1.  There is mild uterine enlargement with a less than 1 cm intramural fibroid.  2.  There is a 2.2 cm left ovarian cyst.    Place of service: [facility]    Electronically signed by: Georgina Mendoza  Date:    03/26/2025  Time:    15:33         Procedure visit on 07/07/2025   Component Date Value Ref Range Status    POC Preg Test, Ur 07/07/2025 Negative  Negative Final     Acceptable 07/07/2025 Yes   Final    Case Report 07/07/2025    Final                    Value:Surgical Pathology                                Case: [id number]                                   Authorizing Provider:  Flores Reddy MD   Collected:           07/07/2025 01:24 PM          Ordering Location:     [facility]   Received:            07/07/2025 03:36 PM                                 Clinic - OB/GYN                                                              Pathologist:           Jeff Granados MD                                                           Specimen:    Endometrium, Spec A: EMB                                                                   Final Diagnosis 07/07/2025    Final                    Value:A. Endometrium, biopsy:  - Benign endometrium with chronic endometritis  - Abundant blood and benign endocervix  - See comments       Comments 07/07/2025    Final                    Value:Dr. Estevez has also reviewed this case, and he agrees with the findings.      Gross Description 07/07/2025    Final                    Value:A.  Endometrium: Spec A: EMB  The specimen is received in formalin designated Spec A: EMB and consists of multiple shaggy pink-tan to red-brown tissue fragments that measure 3.0 x 1.2 cm in aggregate and is entirely submitted in cassette A1.    Grossing was completed by Kvng Fulton.      Microscopic Description 07/07/2025    Final                    Value:A microscopic examination was performed and the diagnosis reflects the findings.          Clinical Information 07/07/2025    Final                    Value:menorrhagia w/irregular cycle and pelvic pain    Laboratory Notes 07/07/2025    Final                    Value:If this report includes immunohistochemical (IHC) test results, please note the following: IHC studies were interpreted in conjunction with appropriate positive and negative controls which demonstrate the expected positive and negative reactivity. This laboratory is regulated under CLIA as qualified to perform high-complexity testing. IHC tests are used for clinical purposes. They should not be regarded as investigational or research.        Case Report 07/07/2025    Final                    Value:Surgical Pathology                                Case: [id number]                                   Authorizing Provider:  Flores Reddy MD   Collected:           07/07/2025 01:24 PM          Ordering Location:     [facility]   Received:            07/07/2025 03:18 PM                                 Clinic - OB/GYN                                                              Pathologist:           Jeff Granados MD                                                           Specimens:   A) - Cervix, Spec A: 12 O'Clock cervical biopsy                                                     B) - Cervix, Spec B: 3 O'Clock cervical bx                                                          C) - Cervix, Spec C: 6 O'Clock cervical bx                                                          D) -  Cervix, Spec D: 9 O'Clock cervical bx                                                          E) - Endocervix, Spec E: ECC                                                               Final Diagnosis 07/07/2025    Final                    Value:A. Cervix, biopsy at 12 o'clock:  Mild chronic cervicitis and reactive changes; negative for dysplasia    B. Cervix, biopsy at 3 o'clock:  Focally marked chronic cervicitis; negative for dysplasia (see comments)      C. Cervix, biopsy at 6 o'clock:  Minimal chronic cervicitis; negative for dysplasia    D. Cervix, biopsy at 9 o'clock:  Acute and chronic cervicitis with reactive atypia; negative for dysplasia (see comments)    E. Endocervix, curettage:  Endocervix with acute and chronic cervicitis and squamous metaplasia; negative for dysplasia (see comments)      Comments 07/07/2025    Final                    Value:P16 immunohistochemistry is performed on blocks B1, D1, and E1.  No significant staining seen.  No dysplasia identified.      Gross Description 07/07/2025    Final                    Value:A. Cervix: Spec A: 12 O'Clock cervical biopsy  The specimen is received in formalin designated Spec A: 12 O'Clock cervical biopsy and consists of a single white-tan tissue fragment that measures 0.4 x 0.3 cm and is entirely submitted in cassette A1.    Grossing was completed by Kvng Fulton.  B. Cervix: Spec B: 3 O'Clock cervical bx  The specimen is received in formalin designated Spec B: 3 O'Clock cervical bx and consists of a white-tan tissue fragment that measures 0.4 x 0.3 cm and is entirely submitted in cassette B1.    Grossing was completed by Kvng Fulton.  C. Cervix: Spec C: 6 O'Clock cervical bx  The specimen is received in formalin designated Spec C: 6 O'Clock cervical bx and consists of a single pink-tan tissue fragment that measures 0.5 x 0.3 cm and is entirely submitted in cassette C1.    Grossing was completed by Kvng Fulton.  D. Cervix: Spec D: 9  O'Clock cervical bx  The specimen is received in formalin designated Spec D: 9 O'Clock cervical bx and                           consists of a single white-tan tissue fragment and clear mucoid material that measures 0.7 x 0.5 cm in aggregate and is entirely submitted in cassette D1.    Grossing was completed by Kvng Fulton.  E. Endocervix: Spec E: ECC  The specimen is received in formalin designated Spec E: ECC and consists of multiple minute white-tan tissue fragments that measure 1.0 x 0.5 cm in aggregate and is entirely submitted in cassette E1.    Grossing was completed by Kvng Fulton.      Microscopic Description 07/07/2025    Final                    Value:A microscopic examination was performed and the diagnosis reflects the findings.          Clinical Information 07/07/2025    Final                    Value:hpv    Laboratory Notes 07/07/2025    Final                    Value:If this report includes immunohistochemical (IHC) test results, please note the following: IHC studies were interpreted in conjunction with appropriate positive and negative controls which demonstrate the expected positive and negative reactivity. This laboratory is regulated under CLIA as qualified to perform high-complexity testing. IHC tests are used for clinical purposes. They should not be regarded as investigational or research.       Office Visit on 06/24/2025   Component Date Value Ref Range Status    Free T4 06/24/2025 1.03  0.70 - 1.48 ng/dL Final    TSH 06/24/2025 2.458  0.350 - 4.940 uIU/mL Final    LH, Blood 06/24/2025 1.2  mIu/mL Final    FSH 06/24/2025 4.6  mIU/mL Final    Testosterone 06/24/2025 <13.0 (L)  13.8 - 15.4 ng/dL Final    WBC 06/24/2025 14.05 (H)  4.50 - 11.00 K/uL Final    RBC 06/24/2025 5.22  4.20 - 5.40 M/uL Final    Hemoglobin 06/24/2025 14.7  12.0 - 16.0 g/dL Final    Hematocrit 06/24/2025 44.8  38.0 - 47.0 % Final    MCV 06/24/2025 85.8  80.0 - 96.0 fL Final    MCH 06/24/2025 28.2  27.0 - 31.0  pg Final    MCHC 06/24/2025 32.8  32.0 - 36.0 g/dL Final    RDW 06/24/2025 13.4  11.5 - 14.5 % Final    Platelet Count 06/24/2025 337  150 - 400 K/uL Final    MPV 06/24/2025 12.2  9.4 - 12.4 fL Final    Neutrophils % 06/24/2025 63.2  53.0 - 65.0 % Final    Lymphocytes % 06/24/2025 29.7  27.0 - 41.0 % Final    Monocytes % 06/24/2025 5.6  2.0 - 6.0 % Final    Eosinophils % 06/24/2025 0.8 (L)  1.0 - 4.0 % Final    Basophils % 06/24/2025 0.4  0.0 - 1.0 % Final    Immature Granulocytes % 06/24/2025 0.3  0.0 - 0.4 % Final    nRBC, Auto 06/24/2025 0.0  <=0.0 % Final    Neutrophils, Abs 06/24/2025 8.88 (H)  1.80 - 7.70 K/uL Final    Lymphocytes, Absolute 06/24/2025 4.17  1.00 - 4.80 K/uL Final    Monocytes, Absolute 06/24/2025 0.79  0.00 - 0.80 K/uL Final    Eosinophils, Absolute 06/24/2025 0.11  0.00 - 0.50 K/uL Final    Basophils, Absolute 06/24/2025 0.06  0.00 - 0.20 K/uL Final    Immature Granulocytes, Absolute 06/24/2025 0.04  0.00 - 0.04 K/uL Final    nRBC, Absolute 06/24/2025 0.00  <=0.00 x10e3/uL Final    Diff Type 06/24/2025 Manual   Final    Segmented Neutrophils, Man % 06/24/2025 60  50 - 62 % Final    Lymphocytes, Man % 06/24/2025 35  27 - 41 % Final    Monocytes, Man % 06/24/2025 5  2 - 6 % Final    Platelet Morphology 06/24/2025 Normal  Normal Final    Anisocytosis 06/24/2025 2+   Final    Ovalocytes 06/24/2025 Few   Final    Polychromasia 06/24/2025 Few   Final   Office Visit on 04/21/2025   Component Date Value Ref Range Status    POC Amphetamines 04/21/2025 Negative  Negative, Inconclusive Final    POC Barbiturates 04/21/2025 Negative  Negative, Inconclusive Final    POC Benzodiazepines 04/21/2025 Negative  Negative, Inconclusive Final    POC Cocaine 04/21/2025 Negative  Negative, Inconclusive Final    POC THC 04/21/2025 Negative  Negative, Inconclusive Final    POC Methadone 04/21/2025 Negative  Negative, Inconclusive Final    POC Methamphetamine 04/21/2025 Negative  Negative, Inconclusive Final    POC  Opiates 04/21/2025 Presumptive Positive (A)  Negative, Inconclusive Final    POC Oxycodone 04/21/2025 Negative  Negative, Inconclusive Final    POC Phencyclidine 04/21/2025 Negative  Negative, Inconclusive Final    POC Methylenedioxymethamphetamine * 04/21/2025 Negative  Negative, Inconclusive Final    POC Tricyclic Antidepressants 04/21/2025 Negative  Negative, Inconclusive Final    POC Buprenorphine 04/21/2025 Negative   Final     Acceptable 04/21/2025 Yes   Final    POC Temperature (Urine) 04/21/2025 94   Final   Office Visit on 03/25/2025   Component Date Value Ref Range Status    Case Report 03/25/2025    Final                    Value:Pap Cytology                                      Case: [id number]                                   Authorizing Provider:  Georgina Love CNM       Collected:           03/25/2025 12:09 PM          Ordering Location:     [facility]    Received:            03/25/2025 12:09 PM                                 [facility]                                                        First Screen:          Bertha Velarde CT(ASCP)                                                       Rescreen:              Jeff Granados MD                                                           Specimen:    Liquid-Based Pap Test, Screening, Endocervix                                               Interpretation 03/25/2025 Negative              Final    General Categorization 03/25/2025 Negative for intraepithelial lesion or malignancy   Final    Specimen Adequacy 03/25/2025 Satisfactory for evaluation   Final    Clinical Information 03/25/2025    Final                    Value:unsure of past Pap hx    Disclaimer 03/25/2025    Final                    Value:Notice: An irreducible false negative rate exists with pap smears, therefore a negative result does not absolutely exclude malignancy.      TSH 03/25/2025 1.225  0.350 - 4.940 uIU/mL Final    Free T4 03/25/2025  0.83  0.70 - 1.48 ng/dL Final    Hemoglobin A1C 03/25/2025 5.2  <=7.0 % Final    Estimated Average Glucose 03/25/2025 103  mg/dL Final    HPV 16 03/25/2025 Negative  Negative, Invalid Final    HPV 18 03/25/2025 Positive (A)  Negative, Invalid Final    HPV Other 03/25/2025 Negative  Negative, Invalid Final   Office Visit on 02/18/2025   Component Date Value Ref Range Status    ESR Westergren 02/18/2025 10  0 - 20 mm/Hr Final    TAMY Screen 02/18/2025 Negative  Negative Final    RA 02/18/2025 Negative  Negative Final    CRP, High Senstivity 02/18/2025 2.17 (H)  <0.50 mg/L Final    Sodium 02/18/2025 140  136 - 145 mmol/L Final    Potassium 02/18/2025 3.9  3.5 - 5.1 mmol/L Final    Chloride 02/18/2025 103  98 - 107 mmol/L Final    CO2 02/18/2025 26  22 - 29 mmol/L Final    Anion Gap 02/18/2025 15  7 - 16 mmol/L Final    Glucose 02/18/2025 94  74 - 100 mg/dL Final    BUN 02/18/2025 4 (L)  7 - 19 mg/dL Final    Creatinine 02/18/2025 0.90  0.55 - 1.02 mg/dL Final    BUN/Creatinine Ratio 02/18/2025 4 (L)  6 - 20 Final    Calcium 02/18/2025 10.1  8.4 - 10.2 mg/dL Final    Total Protein 02/18/2025 6.9  6.4 - 8.3 g/dL Final    Albumin 02/18/2025 3.9  3.5 - 5.0 g/dL Final    Globulin 02/18/2025 3.0  2.0 - 4.0 g/dL Final    A/G Ratio 02/18/2025 1.3   Final    Bilirubin, Total 02/18/2025 0.3  <=1.5 mg/dL Final    Alk Phos 02/18/2025 93  40 - 150 U/L Final    ALT 02/18/2025 20  <=55 U/L Final    AST 02/18/2025 18  5 - 34 U/L Final    eGFR 02/18/2025 84  >=60 mL/min/1.73m2 Final    WBC 02/18/2025 10.60  4.50 - 11.00 K/uL Final    RBC 02/18/2025 4.98  4.20 - 5.40 M/uL Final    Hemoglobin 02/18/2025 14.1  12.0 - 16.0 g/dL Final    Hematocrit 02/18/2025 45.1  38.0 - 47.0 % Final    MCV 02/18/2025 90.6  80.0 - 96.0 fL Final    MCH 02/18/2025 28.3  27.0 - 31.0 pg Final    MCHC 02/18/2025 31.3 (L)  32.0 - 36.0 g/dL Final    RDW 02/18/2025 13.0  11.5 - 14.5 % Final    Platelet Count 02/18/2025 333  150 - 400 K/uL Final    MPV 02/18/2025  11.8  9.4 - 12.4 fL Final    Neutrophils % 02/18/2025 61.0  53.0 - 65.0 % Final    Lymphocytes % 02/18/2025 30.6  27.0 - 41.0 % Final    Monocytes % 02/18/2025 5.7  2.0 - 6.0 % Final    Eosinophils % 02/18/2025 1.6  1.0 - 4.0 % Final    Basophils % 02/18/2025 0.7  0.0 - 1.0 % Final    Immature Granulocytes % 02/18/2025 0.4  0.0 - 0.4 % Final    nRBC, Auto 02/18/2025 0.0  <=0.0 % Final    Neutrophils, Abs 02/18/2025 6.48  1.80 - 7.70 K/uL Final    Lymphocytes, Absolute 02/18/2025 3.24  1.00 - 4.80 K/uL Final    Monocytes, Absolute 02/18/2025 0.60  0.00 - 0.80 K/uL Final    Eosinophils, Absolute 02/18/2025 0.17  0.00 - 0.50 K/uL Final    Basophils, Absolute 02/18/2025 0.07  0.00 - 0.20 K/uL Final    Immature Granulocytes, Absolute 02/18/2025 0.04  0.00 - 0.04 K/uL Final    nRBC, Absolute 02/18/2025 0.00  <=0.00 x10e3/uL Final    Diff Type 02/18/2025 Auto   Final         Orders Placed This Encounter   Procedures    Controlled Substance Monitoring Panel, Random, Urine     Standing Status:   Future     Number of Occurrences:   1     Expected Date:   7/21/2025     Expiration Date:   9/19/2026     Send normal result to authorizing provider's In Basket if patient is active on MyChart::   Yes    POCT Urine Drug Screen Presump     Interpretive Information:     Negative:  No drug detected at the cut off level.   Positive:  This result represents presumptive positive for the   tested drug, other substances may yield a positive response other   than the analyte of interest. This result should be utilized for   diagnostic purpose only. Confirmation testing will be performed upon physician request only.            Requested Prescriptions     Signed Prescriptions Disp Refills    HYDROcodone-acetaminophen (NORCO) 7.5-325 mg per tablet 90 tablet 0     Sig: Take 1 tablet by mouth every 8 (eight) hours as needed for Pain.    HYDROcodone-acetaminophen (NORCO) 7.5-325 mg per tablet 90 tablet 0     Sig: Take 1 tablet by mouth every 8  (eight) hours as needed for Pain.    HYDROcodone-acetaminophen (NORCO) 7.5-325 mg per tablet 90 tablet 0     Sig: Take 1 tablet by mouth every 8 (eight) hours as needed for Pain.    pregabalin (LYRICA) 150 MG capsule 270 capsule 0     Sig: Take 1 capsule (150 mg total) by mouth every 8 (eight) hours.    celecoxib (CELEBREX) 200 MG capsule 90 capsule 0     Sig: Take 1 capsule (200 mg total) by mouth once daily.    tiZANidine (ZANAFLEX) 4 MG tablet 270 tablet 0     Sig: Take 1 tablet (4 mg total) by mouth every 8 (eight) hours.       Assessment:     1. Lumbosacral spondylosis without myelopathy    2. Spondylolisthesis of lumbar region    3. Cervical spondylosis without myelopathy    4. Polyarthralgia    5. Encounter for long-term (current) use of medications               A's of Opioid Risk Assessment  Activity:Patient can perform ADL.   Analgesia:Patients pain is partially controlled by current medication. Patient has tried OTC medications such as Tylenol and Ibuprofen with out relief.   Adverse Effects: Patient denies constipation or sedation.  Aberrant Behavior:  reviewed with no aberrant drug seeking/taking behavior.  Overdose reversal drug naloxone discussed    Drug screen reviewed        Plan:    Prefers printed prescription    Narcan June 2024      She states she is doing well current medication does help with her discomfort     She would like to continue current treatment plan    Continue home exercise program as directed    Continue current medication    Follow-up 3 months     Dr. Velarde, December 2025    Bring original prescription medication bottles/container/box with labels to each visit

## 2025-07-21 ENCOUNTER — OFFICE VISIT (OUTPATIENT)
Dept: PAIN MEDICINE | Facility: CLINIC | Age: 40
End: 2025-07-21

## 2025-07-21 VITALS
DIASTOLIC BLOOD PRESSURE: 87 MMHG | RESPIRATION RATE: 20 BRPM | HEART RATE: 94 BPM | BODY MASS INDEX: 29.47 KG/M2 | SYSTOLIC BLOOD PRESSURE: 143 MMHG | WEIGHT: 199 LBS | HEIGHT: 69 IN

## 2025-07-21 DIAGNOSIS — M43.16 SPONDYLOLISTHESIS OF LUMBAR REGION: Chronic | ICD-10-CM

## 2025-07-21 DIAGNOSIS — M47.817 LUMBOSACRAL SPONDYLOSIS WITHOUT MYELOPATHY: Primary | Chronic | ICD-10-CM

## 2025-07-21 DIAGNOSIS — M25.50 POLYARTHRALGIA: Chronic | ICD-10-CM

## 2025-07-21 DIAGNOSIS — Z79.899 ENCOUNTER FOR LONG-TERM (CURRENT) USE OF MEDICATIONS: ICD-10-CM

## 2025-07-21 DIAGNOSIS — M47.812 CERVICAL SPONDYLOSIS WITHOUT MYELOPATHY: Chronic | ICD-10-CM

## 2025-07-21 LAB
CTP QC/QA: YES
POC (AMP) AMPHETAMINE: ABNORMAL
POC (BAR) BARBITURATES: NEGATIVE
POC (BUP) BUPRENORPHINE: NEGATIVE
POC (BZO) BENZODIAZEPINES: NEGATIVE
POC (COC) COCAINE: NEGATIVE
POC (MDMA) METHYLENEDIOXYMETHAMPHETAMINE 3,4: NEGATIVE
POC (MET) METHAMPHETAMINE: NEGATIVE
POC (MOP) OPIATES: NEGATIVE
POC (MTD) METHADONE: NEGATIVE
POC (OXY) OXYCODONE: NEGATIVE
POC (PCP) PHENCYCLIDINE: NEGATIVE
POC (TCA) TRICYCLIC ANTIDEPRESSANTS: NEGATIVE
POC TEMPERATURE (URINE): 92
POC THC: NEGATIVE

## 2025-07-21 PROCEDURE — 99214 OFFICE O/P EST MOD 30 MIN: CPT | Mod: PBBFAC | Performed by: PHYSICIAN ASSISTANT

## 2025-07-21 PROCEDURE — 80305 DRUG TEST PRSMV DIR OPT OBS: CPT | Mod: PBBFAC | Performed by: PHYSICIAN ASSISTANT

## 2025-07-21 PROCEDURE — 99999PBSHW POCT URINE DRUG SCREEN PRESUMP: Mod: PBBFAC,,,

## 2025-07-21 PROCEDURE — 99999 PR PBB SHADOW E&M-EST. PATIENT-LVL IV: CPT | Mod: PBBFAC,,, | Performed by: PHYSICIAN ASSISTANT

## 2025-07-21 PROCEDURE — 99214 OFFICE O/P EST MOD 30 MIN: CPT | Mod: S$PBB,,, | Performed by: PHYSICIAN ASSISTANT

## 2025-07-21 RX ORDER — CELECOXIB 200 MG/1
200 CAPSULE ORAL DAILY
Qty: 90 CAPSULE | Refills: 0 | Status: SHIPPED | OUTPATIENT
Start: 2025-07-21

## 2025-07-21 RX ORDER — HYDROCODONE BITARTRATE AND ACETAMINOPHEN 7.5; 325 MG/1; MG/1
1 TABLET ORAL EVERY 8 HOURS PRN
Qty: 90 TABLET | Refills: 0 | Status: SHIPPED | OUTPATIENT
Start: 2025-07-26

## 2025-07-21 RX ORDER — PREGABALIN 150 MG/1
150 CAPSULE ORAL EVERY 8 HOURS
Qty: 270 CAPSULE | Refills: 0 | Status: SHIPPED | OUTPATIENT
Start: 2025-07-21

## 2025-07-21 RX ORDER — HYDROCODONE BITARTRATE AND ACETAMINOPHEN 7.5; 325 MG/1; MG/1
1 TABLET ORAL EVERY 8 HOURS PRN
Qty: 90 TABLET | Refills: 0 | Status: SHIPPED | OUTPATIENT
Start: 2025-09-24

## 2025-07-21 RX ORDER — TIZANIDINE 4 MG/1
4 TABLET ORAL EVERY 8 HOURS
Qty: 270 TABLET | Refills: 0 | Status: SHIPPED | OUTPATIENT
Start: 2025-07-21

## 2025-07-21 RX ORDER — HYDROCODONE BITARTRATE AND ACETAMINOPHEN 7.5; 325 MG/1; MG/1
1 TABLET ORAL EVERY 8 HOURS PRN
Qty: 90 TABLET | Refills: 0 | Status: SHIPPED | OUTPATIENT
Start: 2025-08-25

## 2025-07-21 RX ORDER — CYCLOBENZAPRINE HCL 10 MG
10 TABLET ORAL 3 TIMES DAILY PRN
Qty: 90 TABLET | Refills: 2 | Status: CANCELLED | OUTPATIENT
Start: 2025-07-21

## 2025-07-25 LAB
1OH-MIDAZOLAM UR QL SCN: NOT DETECTED NG/ML
2-OH-ETHYLFLURAZ UR QL SCN: NOT DETECTED NG/ML
6MAM UR QL SCN: NOT DETECTED NG/ML
7AMINOCLONAZEPAM UR QL SCN: NOT DETECTED NG/ML
7AMINOFLUNITRAZEPAM UR QL SCN: NOT DETECTED NG/ML
A-OH ALPRAZ GLUC UR QL SCN: NOT DETECTED NG/ML
A-OH ALPRAZ UR QL SCN: NOT DETECTED NG/ML
A-OH-TRIAZOLAM UR QL SCN: NOT DETECTED NG/ML
ALPRAZ UR QL SCN: NOT DETECTED NG/ML
AMPHET UR QL SCN: PRESENT NG/ML
ANNOTATION COMMENT IMP: NORMAL
BARBITURATES UR QL SCN>200 NG/ML: NEGATIVE NG/ML
BUPRENORPHINE UR QL SCN: NOT DETECTED NG/ML
C6G UR QL SCN: NOT DETECTED NG/ML
CHLORDIAZEP UR QL SCN: NOT DETECTED NG/ML
CLOBAZAM UR QL SCN: NOT DETECTED NG/ML
CLONAZEPAM UR QL SCN: NOT DETECTED NG/ML
COCAINE UR QL SCN: NEGATIVE NG/ML
CODEINE UR QL SCN: NOT DETECTED NG/ML
CREAT UR-MCNC: 26 MG/DL
DHC UR QL SCN: PRESENT NG/ML
DIAZEPAM UR QL SCN: NOT DETECTED NG/ML
DRUG SCREEN COMMENT UR-IMP: ABNORMAL
EDDP UR QL SCN: NOT DETECTED NG/ML
EPHEDRIN UR QL SCN: NOT DETECTED NG/ML
FENTANYL UR QL SCN: NOT DETECTED NG/ML
FLUNITRAZEPAM UR QL SCN: NOT DETECTED NG/ML
FLURAZEPAM UR QL SCN: NOT DETECTED NG/ML
H3G UR QL SCN: NOT DETECTED NG/ML
HYDROCODONE UR QL SCN: PRESENT NG/ML
HYDROMORPHONE UR QL SCN: NOT DETECTED NG/ML
LORAZEPAM GLUCURONIDE UR QL SCN: NOT DETECTED NG/ML
LORAZEPAM UR QL SCN: NOT DETECTED NG/ML
MDA UR QL SCN: NOT DETECTED NG/ML
MDEA UR QL SCN: NOT DETECTED NG/ML
MDMA UR QL SCN: NOT DETECTED NG/ML
ME-PHENIDATE UR QL SCN: NOT DETECTED NG/ML
MEDICATIONS MEDICATION.CURRENT: ABNORMAL
MEPERIDINE UR QL SCN: NOT DETECTED NG/ML
METHADONE UR QL SCN: NOT DETECTED NG/ML
METHAMPHET UR QL SCN: NOT DETECTED NG/ML
MIDAZOLAM UR QL SCN: NOT DETECTED NG/ML
MORPHINE UR QL SCN: NOT DETECTED NG/ML
MORPHINE-6-GLUCURONIDE UR QL SCN: NOT DETECTED NG/ML
N3G UR QL SCN: NOT DETECTED NG/ML
NALOXONE UR QL SCN: NOT DETECTED NG/ML
NALOXONE-3G UR QL SCN: NOT DETECTED NG/ML
NORBUPRENORPHINE UR QL SCN: NOT DETECTED NG/ML
NORCLOBAZAM UR QL SCN: NOT DETECTED NG/ML
NORDIAZEPAM UR QL SCN: NOT DETECTED NG/ML
NORFENTANYL UR QL: NOT DETECTED NG/ML
NORHYDROCODONE UR QL SCN: PRESENT NG/ML
NORMEPERIDINE UR QL: NOT DETECTED NG/ML
NOROXYCODONE UR QL SCN: NOT DETECTED NG/ML
NOROXYMORPHONE UR QL SCN: NOT DETECTED NG/ML
NORPROPOXYPH UR QL SCN: NOT DETECTED NG/ML
NORTAPENTADOL UR QL SCN: NOT DETECTED NG/ML
O-NORTRAMADOL UR QL: NOT DETECTED NG/ML
O3G UR QL SCN: NOT DETECTED NG/ML
OXAZEPAM GLUCURONIDE UR QL SCN: NOT DETECTED NG/ML
OXAZEPAM UR QL SCN: NOT DETECTED NG/ML
OXIDANTS UR QL: NEGATIVE
OXYCODONE UR QL SCN: NOT DETECTED NG/ML
OXYMORPHONE UR QL SCN: NOT DETECTED NG/ML
PCP UR QL SCN: NOT DETECTED NG/ML
PH UR: 6.4 [PH]
PHENTERMINE UR QL SCN: NOT DETECTED NG/ML
PPAA UR QL SCN: NOT DETECTED NG/ML
PRAZEPAM UR QL SCN: NOT DETECTED NG/ML
PROPOXYPH UR QL SCN: NOT DETECTED NG/ML
PSEUDOEPHEDRINE UR QL SCN: NOT DETECTED NG/ML
SP GR UR REFRACTOMETRY: 1.01
TAPEN GLUC UR QL SCN: NOT DETECTED NG/ML
TAPENTADOL UR QL SCN: NOT DETECTED NG/ML
TEMAZEPAM GLUCURONIDE UR QL SCN: NOT DETECTED NG/ML
TEMAZEPAM UR QL SCN: NOT DETECTED NG/ML
THC UR QL SCN: NEGATIVE NG/ML
TOXICOLOGIST REVIEW: ABNORMAL
TOXICOLOGIST REVIEW: ABNORMAL
TRAMADOL UR QL SCN: NOT DETECTED NG/ML
TRIAZOLAM UR QL SCN: NOT DETECTED NG/ML
ZOLPIDEM PHENYL-4-CARB UR QL SCN: NOT DETECTED NG/ML
ZOLPIDEM UR QL SCN: NOT DETECTED NG/ML

## 2025-08-04 ENCOUNTER — TELEPHONE (OUTPATIENT)
Dept: OBSTETRICS AND GYNECOLOGY | Facility: CLINIC | Age: 40
End: 2025-08-04

## 2025-08-04 NOTE — TELEPHONE ENCOUNTER
Copied from CRM #[id number]. Topic: Appointments - Appointment Rescheduling  >> Aug 1, 2025 11:22 AM Cyn wrote:  Patient called to RS procedure currently scheduled for 8/13 requested a call back.

## 2025-08-09 ENCOUNTER — PATIENT MESSAGE (OUTPATIENT)
Dept: OBSTETRICS AND GYNECOLOGY | Facility: CLINIC | Age: 40
End: 2025-08-09

## 2025-08-12 RX ORDER — MEDROXYPROGESTERONE ACETATE 10 MG/1
10 TABLET ORAL DAILY
Qty: 30 TABLET | Refills: 0 | Status: SHIPPED | OUTPATIENT
Start: 2025-08-12 | End: 2025-09-11

## 2025-08-14 ENCOUNTER — TELEPHONE (OUTPATIENT)
Dept: DERMATOLOGY | Facility: CLINIC | Age: 40
End: 2025-08-14

## 2025-08-29 ENCOUNTER — PATIENT MESSAGE (OUTPATIENT)
Facility: HOSPITAL | Age: 40
End: 2025-08-29